# Patient Record
Sex: FEMALE | Race: OTHER | HISPANIC OR LATINO | URBAN - METROPOLITAN AREA
[De-identification: names, ages, dates, MRNs, and addresses within clinical notes are randomized per-mention and may not be internally consistent; named-entity substitution may affect disease eponyms.]

---

## 2017-07-01 ENCOUNTER — EMERGENCY (EMERGENCY)
Facility: HOSPITAL | Age: 37
LOS: 1 days | Discharge: HOME | End: 2017-07-01
Attending: STUDENT IN AN ORGANIZED HEALTH CARE EDUCATION/TRAINING PROGRAM
Payer: COMMERCIAL

## 2017-07-01 VITALS
HEART RATE: 75 BPM | OXYGEN SATURATION: 99 % | RESPIRATION RATE: 18 BRPM | DIASTOLIC BLOOD PRESSURE: 93 MMHG | TEMPERATURE: 98 F | SYSTOLIC BLOOD PRESSURE: 147 MMHG

## 2017-07-01 VITALS — WEIGHT: 149.91 LBS | HEIGHT: 61 IN

## 2017-07-01 DIAGNOSIS — J39.2 OTHER DISEASES OF PHARYNX: ICD-10-CM

## 2017-07-01 DIAGNOSIS — E78.00 PURE HYPERCHOLESTEROLEMIA, UNSPECIFIED: ICD-10-CM

## 2017-07-01 DIAGNOSIS — E03.9 HYPOTHYROIDISM, UNSPECIFIED: ICD-10-CM

## 2017-07-01 DIAGNOSIS — Z98.890 OTHER SPECIFIED POSTPROCEDURAL STATES: ICD-10-CM

## 2017-07-01 DIAGNOSIS — R51 HEADACHE: ICD-10-CM

## 2017-07-01 PROCEDURE — 99284 EMERGENCY DEPT VISIT MOD MDM: CPT | Mod: 25

## 2017-07-02 LAB
ALBUMIN SERPL ELPH-MCNC: 4.7 G/DL — SIGNIFICANT CHANGE UP (ref 3.3–5.2)
ALP SERPL-CCNC: 63 U/L — SIGNIFICANT CHANGE UP (ref 40–120)
ALT FLD-CCNC: 43 U/L — HIGH
ANION GAP SERPL CALC-SCNC: 13 MMOL/L — SIGNIFICANT CHANGE UP (ref 5–17)
AST SERPL-CCNC: 24 U/L — SIGNIFICANT CHANGE UP
BASOPHILS # BLD AUTO: 0 K/UL — SIGNIFICANT CHANGE UP (ref 0–0.2)
BASOPHILS NFR BLD AUTO: 0.1 % — SIGNIFICANT CHANGE UP (ref 0–2)
BILIRUB SERPL-MCNC: 0.2 MG/DL — LOW (ref 0.4–2)
BUN SERPL-MCNC: 13 MG/DL — SIGNIFICANT CHANGE UP (ref 8–20)
CALCIUM SERPL-MCNC: 9.1 MG/DL — SIGNIFICANT CHANGE UP (ref 8.6–10.2)
CHLORIDE SERPL-SCNC: 99 MMOL/L — SIGNIFICANT CHANGE UP (ref 98–107)
CO2 SERPL-SCNC: 26 MMOL/L — SIGNIFICANT CHANGE UP (ref 22–29)
CREAT SERPL-MCNC: 0.61 MG/DL — SIGNIFICANT CHANGE UP (ref 0.5–1.3)
EOSINOPHIL # BLD AUTO: 0.2 K/UL — SIGNIFICANT CHANGE UP (ref 0–0.5)
EOSINOPHIL NFR BLD AUTO: 2.5 % — SIGNIFICANT CHANGE UP (ref 0–6)
GLUCOSE SERPL-MCNC: 105 MG/DL — SIGNIFICANT CHANGE UP (ref 70–115)
HCT VFR BLD CALC: 40.7 % — SIGNIFICANT CHANGE UP (ref 37–47)
HGB BLD-MCNC: 13.8 G/DL — SIGNIFICANT CHANGE UP (ref 12–16)
LYMPHOCYTES # BLD AUTO: 2.3 K/UL — SIGNIFICANT CHANGE UP (ref 1–4.8)
LYMPHOCYTES # BLD AUTO: 34.3 % — SIGNIFICANT CHANGE UP (ref 20–55)
MCHC RBC-ENTMCNC: 28.5 PG — SIGNIFICANT CHANGE UP (ref 27–31)
MCHC RBC-ENTMCNC: 33.9 G/DL — SIGNIFICANT CHANGE UP (ref 32–36)
MCV RBC AUTO: 83.9 FL — SIGNIFICANT CHANGE UP (ref 81–99)
MONOCYTES # BLD AUTO: 0.6 K/UL — SIGNIFICANT CHANGE UP (ref 0–0.8)
MONOCYTES NFR BLD AUTO: 9.3 % — SIGNIFICANT CHANGE UP (ref 3–10)
NEUTROPHILS # BLD AUTO: 3.6 K/UL — SIGNIFICANT CHANGE UP (ref 1.8–8)
NEUTROPHILS NFR BLD AUTO: 53.5 % — SIGNIFICANT CHANGE UP (ref 37–73)
PLATELET # BLD AUTO: 356 K/UL — SIGNIFICANT CHANGE UP (ref 150–400)
POTASSIUM SERPL-MCNC: 3.8 MMOL/L — SIGNIFICANT CHANGE UP (ref 3.5–5.3)
POTASSIUM SERPL-SCNC: 3.8 MMOL/L — SIGNIFICANT CHANGE UP (ref 3.5–5.3)
PROT SERPL-MCNC: 7.7 G/DL — SIGNIFICANT CHANGE UP (ref 6.6–8.7)
RBC # BLD: 4.85 M/UL — SIGNIFICANT CHANGE UP (ref 4.4–5.2)
RBC # FLD: 12.2 % — SIGNIFICANT CHANGE UP (ref 11–15.6)
SODIUM SERPL-SCNC: 138 MMOL/L — SIGNIFICANT CHANGE UP (ref 135–145)
T4 AB SER-ACNC: 7 UG/DL — SIGNIFICANT CHANGE UP (ref 4.5–12)
T4/T3 UPTAKE INDEX SERPL: 1.01 INDEX — SIGNIFICANT CHANGE UP (ref 0.8–1.3)
TSH SERPL-MCNC: 0.5 UIU/ML — SIGNIFICANT CHANGE UP (ref 0.27–4.2)
WBC # BLD: 6.7 K/UL — SIGNIFICANT CHANGE UP (ref 4.8–10.8)
WBC # FLD AUTO: 6.7 K/UL — SIGNIFICANT CHANGE UP (ref 4.8–10.8)

## 2017-07-02 PROCEDURE — 70450 CT HEAD/BRAIN W/O DYE: CPT

## 2017-07-02 PROCEDURE — 84443 ASSAY THYROID STIM HORMONE: CPT

## 2017-07-02 PROCEDURE — 36415 COLL VENOUS BLD VENIPUNCTURE: CPT

## 2017-07-02 PROCEDURE — 99284 EMERGENCY DEPT VISIT MOD MDM: CPT | Mod: 25

## 2017-07-02 PROCEDURE — 84479 ASSAY OF THYROID (T3 OR T4): CPT

## 2017-07-02 PROCEDURE — 93005 ELECTROCARDIOGRAM TRACING: CPT

## 2017-07-02 PROCEDURE — 80053 COMPREHEN METABOLIC PANEL: CPT

## 2017-07-02 PROCEDURE — 70450 CT HEAD/BRAIN W/O DYE: CPT | Mod: 26

## 2017-07-02 PROCEDURE — 93010 ELECTROCARDIOGRAM REPORT: CPT

## 2017-07-02 PROCEDURE — 85027 COMPLETE CBC AUTOMATED: CPT

## 2017-07-02 PROCEDURE — 84436 ASSAY OF TOTAL THYROXINE: CPT

## 2017-07-02 RX ORDER — LIDOCAINE 4 G/100G
10 CREAM TOPICAL ONCE
Qty: 0 | Refills: 0 | Status: COMPLETED | OUTPATIENT
Start: 2017-07-02 | End: 2017-07-02

## 2017-07-02 RX ADMIN — LIDOCAINE 10 MILLILITER(S): 4 CREAM TOPICAL at 02:08

## 2017-07-02 NOTE — ED ADULT NURSE NOTE - OBJECTIVE STATEMENT
PT awake and alert x4, presents to ED c/o sore throat and headache x1 week. pt reports sudden onset of symptoms that worsened throughout the week. pt denies fever, chills or night sweats. pt reports 1 episode of vomiting on Sunday but has not vomited since. pt reports nausea. pt denies dizziness, lightheadedness. pt states headache worsens with bright lights. pt denies chest pain or SOB. pt lying comfortably I nbed, family at bedside will continue to monitor.

## 2017-07-02 NOTE — ED PROVIDER NOTE - OBJECTIVE STATEMENT
37 y/o female with a hx of HDL, and hypothyroidism presents to the ED c/o HA that onset 7 days ago. Pt states that the HA has been constant. She also notes "Throat pain" which onset 1 week ago. Nothing makes the pain better or worse. Denies numbness, tingling, fever, chills, vomiting, CP, change in vision/gait/speech or photophobia. No further complaints at this time. 37 y/o female with a hx of HDL, and hypothyroidism presents to the ED c/o HA that onset 7 days ago. Pt states that the HA has been constant. She also notes "Throat discomfort" which onset 1 week ago; feels like there is a tightness around her throat. Nothing makes the pain better or worse. Denies numbness, tingling, fever, chills, vomiting, CP, change in vision/gait/speech or photophobia. No further complaints at this time.

## 2018-03-12 ENCOUNTER — APPOINTMENT (OUTPATIENT)
Dept: DERMATOLOGY | Facility: CLINIC | Age: 38
End: 2018-03-12
Payer: COMMERCIAL

## 2018-03-12 PROCEDURE — 99201 OFFICE OUTPATIENT NEW 10 MINUTES: CPT

## 2018-03-25 ENCOUNTER — EMERGENCY (EMERGENCY)
Facility: HOSPITAL | Age: 38
LOS: 1 days | Discharge: HOME | End: 2018-03-25
Attending: EMERGENCY MEDICINE | Admitting: EMERGENCY MEDICINE
Payer: COMMERCIAL

## 2018-03-25 VITALS
OXYGEN SATURATION: 99 % | RESPIRATION RATE: 17 BRPM | DIASTOLIC BLOOD PRESSURE: 72 MMHG | HEART RATE: 68 BPM | SYSTOLIC BLOOD PRESSURE: 128 MMHG

## 2018-03-25 VITALS — HEIGHT: 62 IN | WEIGHT: 149.03 LBS

## 2018-03-25 LAB
ALBUMIN SERPL ELPH-MCNC: 4.7 G/DL — SIGNIFICANT CHANGE UP (ref 3.3–5.2)
ALP SERPL-CCNC: 76 U/L — SIGNIFICANT CHANGE UP (ref 40–120)
ALT FLD-CCNC: 40 U/L — HIGH
ANION GAP SERPL CALC-SCNC: 11 MMOL/L — SIGNIFICANT CHANGE UP (ref 5–17)
AST SERPL-CCNC: 26 U/L — SIGNIFICANT CHANGE UP
BASOPHILS # BLD AUTO: 0 K/UL — SIGNIFICANT CHANGE UP (ref 0–0.2)
BASOPHILS NFR BLD AUTO: 0.3 % — SIGNIFICANT CHANGE UP (ref 0–2)
BILIRUB SERPL-MCNC: <0.2 MG/DL — LOW (ref 0.4–2)
BUN SERPL-MCNC: 8 MG/DL — SIGNIFICANT CHANGE UP (ref 8–20)
CALCIUM SERPL-MCNC: 9.8 MG/DL — SIGNIFICANT CHANGE UP (ref 8.6–10.2)
CHLORIDE SERPL-SCNC: 100 MMOL/L — SIGNIFICANT CHANGE UP (ref 98–107)
CO2 SERPL-SCNC: 29 MMOL/L — SIGNIFICANT CHANGE UP (ref 22–29)
CREAT SERPL-MCNC: 0.65 MG/DL — SIGNIFICANT CHANGE UP (ref 0.5–1.3)
EOSINOPHIL # BLD AUTO: 0.2 K/UL — SIGNIFICANT CHANGE UP (ref 0–0.5)
EOSINOPHIL NFR BLD AUTO: 3.2 % — SIGNIFICANT CHANGE UP (ref 0–6)
GLUCOSE SERPL-MCNC: 95 MG/DL — SIGNIFICANT CHANGE UP (ref 70–115)
HCG SERPL-ACNC: <5 MIU/ML — SIGNIFICANT CHANGE UP
HCT VFR BLD CALC: 45.2 % — SIGNIFICANT CHANGE UP (ref 37–47)
HGB BLD-MCNC: 15.3 G/DL — SIGNIFICANT CHANGE UP (ref 12–16)
LYMPHOCYTES # BLD AUTO: 2.4 K/UL — SIGNIFICANT CHANGE UP (ref 1–4.8)
LYMPHOCYTES # BLD AUTO: 37 % — SIGNIFICANT CHANGE UP (ref 20–55)
MCHC RBC-ENTMCNC: 29.4 PG — SIGNIFICANT CHANGE UP (ref 27–31)
MCHC RBC-ENTMCNC: 33.8 G/DL — SIGNIFICANT CHANGE UP (ref 32–36)
MCV RBC AUTO: 86.9 FL — SIGNIFICANT CHANGE UP (ref 81–99)
MONOCYTES # BLD AUTO: 0.6 K/UL — SIGNIFICANT CHANGE UP (ref 0–0.8)
MONOCYTES NFR BLD AUTO: 8.4 % — SIGNIFICANT CHANGE UP (ref 3–10)
NEUTROPHILS # BLD AUTO: 3.3 K/UL — SIGNIFICANT CHANGE UP (ref 1.8–8)
NEUTROPHILS NFR BLD AUTO: 50.9 % — SIGNIFICANT CHANGE UP (ref 37–73)
PLATELET # BLD AUTO: 363 K/UL — SIGNIFICANT CHANGE UP (ref 150–400)
POTASSIUM SERPL-MCNC: 4.3 MMOL/L — SIGNIFICANT CHANGE UP (ref 3.5–5.3)
POTASSIUM SERPL-SCNC: 4.3 MMOL/L — SIGNIFICANT CHANGE UP (ref 3.5–5.3)
PROT SERPL-MCNC: 7.9 G/DL — SIGNIFICANT CHANGE UP (ref 6.6–8.7)
RBC # BLD: 5.2 M/UL — SIGNIFICANT CHANGE UP (ref 4.4–5.2)
RBC # FLD: 12.3 % — SIGNIFICANT CHANGE UP (ref 11–15.6)
SODIUM SERPL-SCNC: 140 MMOL/L — SIGNIFICANT CHANGE UP (ref 135–145)
WBC # BLD: 6.6 K/UL — SIGNIFICANT CHANGE UP (ref 4.8–10.8)
WBC # FLD AUTO: 6.6 K/UL — SIGNIFICANT CHANGE UP (ref 4.8–10.8)

## 2018-03-25 PROCEDURE — 74019 RADEX ABDOMEN 2 VIEWS: CPT

## 2018-03-25 PROCEDURE — 85027 COMPLETE CBC AUTOMATED: CPT

## 2018-03-25 PROCEDURE — T1013: CPT

## 2018-03-25 PROCEDURE — 36415 COLL VENOUS BLD VENIPUNCTURE: CPT

## 2018-03-25 PROCEDURE — 96374 THER/PROPH/DIAG INJ IV PUSH: CPT

## 2018-03-25 PROCEDURE — 80053 COMPREHEN METABOLIC PANEL: CPT

## 2018-03-25 PROCEDURE — 99284 EMERGENCY DEPT VISIT MOD MDM: CPT

## 2018-03-25 PROCEDURE — 74019 RADEX ABDOMEN 2 VIEWS: CPT | Mod: 26

## 2018-03-25 PROCEDURE — 84702 CHORIONIC GONADOTROPIN TEST: CPT

## 2018-03-25 PROCEDURE — 99284 EMERGENCY DEPT VISIT MOD MDM: CPT | Mod: 25

## 2018-03-25 RX ORDER — DOCUSATE SODIUM 100 MG
1 CAPSULE ORAL
Qty: 60 | Refills: 0 | OUTPATIENT
Start: 2018-03-25 | End: 2018-04-23

## 2018-03-25 RX ORDER — PANTOPRAZOLE SODIUM 20 MG/1
40 TABLET, DELAYED RELEASE ORAL ONCE
Qty: 0 | Refills: 0 | Status: COMPLETED | OUTPATIENT
Start: 2018-03-25 | End: 2018-03-25

## 2018-03-25 RX ORDER — SODIUM CHLORIDE 9 MG/ML
1000 INJECTION INTRAMUSCULAR; INTRAVENOUS; SUBCUTANEOUS ONCE
Qty: 0 | Refills: 0 | Status: COMPLETED | OUTPATIENT
Start: 2018-03-25 | End: 2018-03-25

## 2018-03-25 RX ORDER — POLYETHYLENE GLYCOL 3350 17 G/17G
17 POWDER, FOR SOLUTION ORAL
Qty: 119 | Refills: 0 | OUTPATIENT
Start: 2018-03-25 | End: 2018-03-31

## 2018-03-25 RX ORDER — SODIUM CHLORIDE 9 MG/ML
3 INJECTION INTRAMUSCULAR; INTRAVENOUS; SUBCUTANEOUS ONCE
Qty: 0 | Refills: 0 | Status: COMPLETED | OUTPATIENT
Start: 2018-03-25 | End: 2018-03-25

## 2018-03-25 RX ADMIN — PANTOPRAZOLE SODIUM 40 MILLIGRAM(S): 20 TABLET, DELAYED RELEASE ORAL at 17:18

## 2018-03-25 RX ADMIN — SODIUM CHLORIDE 1000 MILLILITER(S): 9 INJECTION INTRAMUSCULAR; INTRAVENOUS; SUBCUTANEOUS at 17:18

## 2018-03-25 RX ADMIN — SODIUM CHLORIDE 3 MILLILITER(S): 9 INJECTION INTRAMUSCULAR; INTRAVENOUS; SUBCUTANEOUS at 17:18

## 2018-03-25 NOTE — ED STATDOCS - MEDICAL DECISION MAKING DETAILS
Pt presents with constipation and hx of 1 episode of rectal bleed. will hydrate, check labs and X-rays.

## 2018-03-25 NOTE — ED ADULT TRIAGE NOTE - CHIEF COMPLAINT QUOTE
"Since Tuesday I can't have a bowel movement and I am bleeding from my rectal area. I took prune juice and used a suppository but no relief. "  Pt states she can't use the a bathroom pt states she has lower abdominal pain and now feels bloated and like the pain is in her upper abdomen.

## 2018-03-25 NOTE — ED STATDOCS - OBJECTIVE STATEMENT
38 y/o F pt with a hx of hypothyroidism  presents to the ED with c/o constipation and abd pain x 5 days. Pt states that she noted some blood while trying to make a BM 5 days ago. Did not notice any blood ever since. Pt describes the pain as sharp, constant and 5/10. she takes levothyroxine. denies NVD, fevers, chills. No further complaints at this time.

## 2018-03-25 NOTE — ED ADULT NURSE NOTE - OBJECTIVE STATEMENT
36 yo female presents to ed for abdominal pain and difficulty having bm x 4 days. reports that she noticed blood when she wiped "feel like something is there"

## 2018-04-02 ENCOUNTER — APPOINTMENT (OUTPATIENT)
Dept: DERMATOLOGY | Facility: CLINIC | Age: 38
End: 2018-04-02
Payer: COMMERCIAL

## 2018-04-02 PROCEDURE — 99213 OFFICE O/P EST LOW 20 MIN: CPT

## 2018-04-18 ENCOUNTER — APPOINTMENT (OUTPATIENT)
Dept: DERMATOLOGY | Facility: CLINIC | Age: 38
End: 2018-04-18
Payer: COMMERCIAL

## 2018-04-18 PROCEDURE — 99213 OFFICE O/P EST LOW 20 MIN: CPT

## 2018-08-22 ENCOUNTER — APPOINTMENT (OUTPATIENT)
Dept: DERMATOLOGY | Facility: CLINIC | Age: 38
End: 2018-08-22

## 2018-10-06 ENCOUNTER — EMERGENCY (EMERGENCY)
Facility: HOSPITAL | Age: 38
LOS: 1 days | Discharge: HOME | End: 2018-10-06
Attending: EMERGENCY MEDICINE
Payer: COMMERCIAL

## 2018-10-06 VITALS
DIASTOLIC BLOOD PRESSURE: 78 MMHG | SYSTOLIC BLOOD PRESSURE: 134 MMHG | TEMPERATURE: 98 F | HEART RATE: 72 BPM | RESPIRATION RATE: 16 BRPM | OXYGEN SATURATION: 99 %

## 2018-10-06 VITALS — WEIGHT: 149.91 LBS | HEIGHT: 62 IN

## 2018-10-06 PROCEDURE — 99283 EMERGENCY DEPT VISIT LOW MDM: CPT

## 2018-10-06 RX ORDER — ACETAMINOPHEN 500 MG
975 TABLET ORAL ONCE
Qty: 0 | Refills: 0 | Status: COMPLETED | OUTPATIENT
Start: 2018-10-06 | End: 2018-10-06

## 2018-10-06 RX ADMIN — Medication 975 MILLIGRAM(S): at 19:50

## 2018-10-06 NOTE — ED STATDOCS - OBJECTIVE STATEMENT
39 y/o F pt with hx of thyroid presents to ED c/o L ankle/foot pain for 3 days. Pt reports that 3 days ago, she was reaching for something, and felt a pull on her L ankle. Pt reports that when she raises her foot up, she feels pain. Pt's pain aggravates with movement. NKDA. No further complaints at this time. 39 y/o F pt presents to ED c/o L ankle/foot pain for 3 days. Pt reports that 3 days ago, she was reaching for something, and felt a pull on her L ankle. Pt reports that when she raises her foot up, she feels pain. Pt's pain aggravates with movement. NKDA. No further complaints at this time.

## 2018-10-06 NOTE — ED STATDOCS - MEDICAL DECISION MAKING DETAILS
Pt with likely ankle sprain/tendonitis, no bony tenderness to palpation. Will give Tylenol and DC with outpatient follow-up. Pt with likely ankle sprain/tendonitis, no bony tenderness to palpation. ambulatorion without difficulty, no concern for fx Will give Tylenol and DC with outpatient follow-up.

## 2018-10-06 NOTE — ED ADULT NURSE NOTE - OBJECTIVE STATEMENT
Patient complaining of pain to left foot x2 days, stated when walks feels like her feet are numb. Stated when puts leg up, feels like there is a "ripping" sensation to bottom of foot. Stated injury occurred when she was attempting to reach something up high.

## 2018-10-06 NOTE — ED STATDOCS - CARE PLAN
Principal Discharge DX:	Ankle sprain  Assessment and plan of treatment:	1. Return to ED for worsening, progressive or any other concerning symptoms   2. Follow up with your primary care doctor in 2-3days   3. Take motrin 600mg every 6 hours as needed for pain and Take Tylenol up to 650 mg every 6 hours as needed for pain.   4. Follow up with a podiatrist or Follow up with orthopedic clinic [phone].   Open Thursday 1-9pm  Saturday 8-4pm

## 2018-10-06 NOTE — ED STATDOCS - NS_ ATTENDINGSCRIBEDETAILS _ED_A_ED_FT
I, Wendy Leon, performed the initial face to face bedside interview with this patient regarding history of present illness, review of symptoms and relevant past medical, social and family history.  I completed an independent physical examination.  The history, relevant review of systems, past medical and surgical history, medical decision making, and physical examination was documented by the scribe in my presence and I attest to the accuracy of the documentation.

## 2018-10-06 NOTE — ED STATDOCS - PLAN OF CARE
1. Return to ED for worsening, progressive or any other concerning symptoms   2. Follow up with your primary care doctor in 2-3days   3. Take motrin 600mg every 6 hours as needed for pain and Take Tylenol up to 650 mg every 6 hours as needed for pain.   4. Follow up with a podiatrist or Follow up with orthopedic clinic [phone].   Open Thursday 1-9pm  Saturday 8-4pm

## 2018-10-06 NOTE — ED STATDOCS - NS ED ROS FT
ROS: + ankle pain, foot pain - no CP/SOB. no cough. no fever. no n/v/d/c. no abd pain. no rash. no bleeding. no urinary complaints. no weakness. no vision changes. no HA. no neck/back pain. no extremity swelling/deformity. No change in mental status.

## 2018-10-06 NOTE — ED STATDOCS - PHYSICAL EXAMINATION
Gen: NAD, AOx3  Head: NCAT  HEENT: oral mucosa moist, normal conjunctiva, neck supple  Lung: no respiratory distress  CV: Normal perfusion  MSK: No edema, no visible deformities. Positive tenderness lateral; aspect of L ankle, soft tissues. Worse with dorsi flexion.   Neuro: No focal neurologic deficits  Skin: No rash   Psych: normal affect Gen: NAD, AOx3  Head: NCAT  HEENT: oral mucosa moist, normal conjunctiva, neck supple  Lung: no respiratory distress  CV: Normal perfusion  MSK: No edema, no visible deformities. +tenderness lateral; aspect of L ankle, soft tissues. Worse with dorsi flexion. no ttp medial or lateral malleoli and no ttp base 5th metatarsal   Neuro: No focal neurologic deficits  Skin: No rash   Psych: normal affect

## 2018-12-11 ENCOUNTER — APPOINTMENT (OUTPATIENT)
Dept: DERMATOLOGY | Facility: CLINIC | Age: 38
End: 2018-12-11

## 2019-01-03 ENCOUNTER — APPOINTMENT (OUTPATIENT)
Dept: DERMATOLOGY | Facility: CLINIC | Age: 39
End: 2019-01-03
Payer: COMMERCIAL

## 2019-01-03 PROCEDURE — 99212 OFFICE O/P EST SF 10 MIN: CPT

## 2019-12-19 ENCOUNTER — EMERGENCY (EMERGENCY)
Facility: HOSPITAL | Age: 39
LOS: 1 days | Discharge: HOME | End: 2019-12-19
Attending: EMERGENCY MEDICINE
Payer: COMMERCIAL

## 2019-12-19 VITALS
OXYGEN SATURATION: 98 % | RESPIRATION RATE: 18 BRPM | TEMPERATURE: 98 F | SYSTOLIC BLOOD PRESSURE: 122 MMHG | HEART RATE: 78 BPM | DIASTOLIC BLOOD PRESSURE: 79 MMHG

## 2019-12-19 VITALS
HEIGHT: 63 IN | WEIGHT: 153 LBS | TEMPERATURE: 98 F | HEART RATE: 66 BPM | SYSTOLIC BLOOD PRESSURE: 137 MMHG | DIASTOLIC BLOOD PRESSURE: 96 MMHG | OXYGEN SATURATION: 100 % | RESPIRATION RATE: 18 BRPM

## 2019-12-19 LAB
ALBUMIN SERPL ELPH-MCNC: 5.2 G/DL — SIGNIFICANT CHANGE UP (ref 3.3–5.2)
ALP SERPL-CCNC: 70 U/L — SIGNIFICANT CHANGE UP (ref 40–120)
ALT FLD-CCNC: 22 U/L — SIGNIFICANT CHANGE UP
ANION GAP SERPL CALC-SCNC: 13 MMOL/L — SIGNIFICANT CHANGE UP (ref 5–17)
APPEARANCE UR: CLEAR — SIGNIFICANT CHANGE UP
APTT BLD: 31.7 SEC — SIGNIFICANT CHANGE UP (ref 27.5–36.3)
AST SERPL-CCNC: 25 U/L — SIGNIFICANT CHANGE UP
BACTERIA # UR AUTO: ABNORMAL
BASOPHILS # BLD AUTO: 0.06 K/UL — SIGNIFICANT CHANGE UP (ref 0–0.2)
BASOPHILS NFR BLD AUTO: 0.6 % — SIGNIFICANT CHANGE UP (ref 0–2)
BILIRUB SERPL-MCNC: 0.3 MG/DL — LOW (ref 0.4–2)
BILIRUB UR-MCNC: NEGATIVE — SIGNIFICANT CHANGE UP
BLD GP AB SCN SERPL QL: SIGNIFICANT CHANGE UP
BUN SERPL-MCNC: 8 MG/DL — SIGNIFICANT CHANGE UP (ref 8–20)
CALCIUM SERPL-MCNC: 9.4 MG/DL — SIGNIFICANT CHANGE UP (ref 8.6–10.2)
CHLORIDE SERPL-SCNC: 98 MMOL/L — SIGNIFICANT CHANGE UP (ref 98–107)
CO2 SERPL-SCNC: 24 MMOL/L — SIGNIFICANT CHANGE UP (ref 22–29)
COLOR SPEC: YELLOW — SIGNIFICANT CHANGE UP
CREAT SERPL-MCNC: 0.64 MG/DL — SIGNIFICANT CHANGE UP (ref 0.5–1.3)
DIFF PNL FLD: ABNORMAL
EOSINOPHIL # BLD AUTO: 0.17 K/UL — SIGNIFICANT CHANGE UP (ref 0–0.5)
EOSINOPHIL NFR BLD AUTO: 1.7 % — SIGNIFICANT CHANGE UP (ref 0–6)
EPI CELLS # UR: SIGNIFICANT CHANGE UP
GLUCOSE SERPL-MCNC: 94 MG/DL — SIGNIFICANT CHANGE UP (ref 70–115)
GLUCOSE UR QL: NEGATIVE MG/DL — SIGNIFICANT CHANGE UP
HCG SERPL-ACNC: HIGH MIU/ML
HCG UR QL: POSITIVE
HCT VFR BLD CALC: 46 % — HIGH (ref 34.5–45)
HGB BLD-MCNC: 15.4 G/DL — SIGNIFICANT CHANGE UP (ref 11.5–15.5)
IMM GRANULOCYTES NFR BLD AUTO: 0.3 % — SIGNIFICANT CHANGE UP (ref 0–1.5)
INR BLD: 0.93 RATIO — SIGNIFICANT CHANGE UP (ref 0.88–1.16)
KETONES UR-MCNC: NEGATIVE — SIGNIFICANT CHANGE UP
LEUKOCYTE ESTERASE UR-ACNC: ABNORMAL
LYMPHOCYTES # BLD AUTO: 3.26 K/UL — SIGNIFICANT CHANGE UP (ref 1–3.3)
LYMPHOCYTES # BLD AUTO: 32.3 % — SIGNIFICANT CHANGE UP (ref 13–44)
MCHC RBC-ENTMCNC: 29.5 PG — SIGNIFICANT CHANGE UP (ref 27–34)
MCHC RBC-ENTMCNC: 33.5 GM/DL — SIGNIFICANT CHANGE UP (ref 32–36)
MCV RBC AUTO: 88.1 FL — SIGNIFICANT CHANGE UP (ref 80–100)
MONOCYTES # BLD AUTO: 0.69 K/UL — SIGNIFICANT CHANGE UP (ref 0–0.9)
MONOCYTES NFR BLD AUTO: 6.8 % — SIGNIFICANT CHANGE UP (ref 2–14)
NEUTROPHILS # BLD AUTO: 5.88 K/UL — SIGNIFICANT CHANGE UP (ref 1.8–7.4)
NEUTROPHILS NFR BLD AUTO: 58.3 % — SIGNIFICANT CHANGE UP (ref 43–77)
NITRITE UR-MCNC: NEGATIVE — SIGNIFICANT CHANGE UP
PH UR: 7 — SIGNIFICANT CHANGE UP (ref 5–8)
PLATELET # BLD AUTO: 412 K/UL — HIGH (ref 150–400)
POTASSIUM SERPL-MCNC: 4.1 MMOL/L — SIGNIFICANT CHANGE UP (ref 3.5–5.3)
POTASSIUM SERPL-SCNC: 4.1 MMOL/L — SIGNIFICANT CHANGE UP (ref 3.5–5.3)
PROT SERPL-MCNC: 8.5 G/DL — SIGNIFICANT CHANGE UP (ref 6.6–8.7)
PROT UR-MCNC: NEGATIVE MG/DL — SIGNIFICANT CHANGE UP
PROTHROM AB SERPL-ACNC: 10.7 SEC — SIGNIFICANT CHANGE UP (ref 10–12.9)
RBC # BLD: 5.22 M/UL — HIGH (ref 3.8–5.2)
RBC # FLD: 12.6 % — SIGNIFICANT CHANGE UP (ref 10.3–14.5)
RBC CASTS # UR COMP ASSIST: SIGNIFICANT CHANGE UP /HPF (ref 0–4)
SODIUM SERPL-SCNC: 135 MMOL/L — SIGNIFICANT CHANGE UP (ref 135–145)
SP GR SPEC: 1 — LOW (ref 1.01–1.02)
UROBILINOGEN FLD QL: NEGATIVE MG/DL — SIGNIFICANT CHANGE UP
WBC # BLD: 10.09 K/UL — SIGNIFICANT CHANGE UP (ref 3.8–10.5)
WBC # FLD AUTO: 10.09 K/UL — SIGNIFICANT CHANGE UP (ref 3.8–10.5)
WBC UR QL: SIGNIFICANT CHANGE UP

## 2019-12-19 PROCEDURE — 85610 PROTHROMBIN TIME: CPT

## 2019-12-19 PROCEDURE — 76801 OB US < 14 WKS SINGLE FETUS: CPT | Mod: 26

## 2019-12-19 PROCEDURE — 81025 URINE PREGNANCY TEST: CPT

## 2019-12-19 PROCEDURE — 81001 URINALYSIS AUTO W/SCOPE: CPT

## 2019-12-19 PROCEDURE — 99284 EMERGENCY DEPT VISIT MOD MDM: CPT

## 2019-12-19 PROCEDURE — 84702 CHORIONIC GONADOTROPIN TEST: CPT

## 2019-12-19 PROCEDURE — 86850 RBC ANTIBODY SCREEN: CPT

## 2019-12-19 PROCEDURE — 36415 COLL VENOUS BLD VENIPUNCTURE: CPT

## 2019-12-19 PROCEDURE — 86901 BLOOD TYPING SEROLOGIC RH(D): CPT

## 2019-12-19 PROCEDURE — 87186 SC STD MICRODIL/AGAR DIL: CPT

## 2019-12-19 PROCEDURE — 85027 COMPLETE CBC AUTOMATED: CPT

## 2019-12-19 PROCEDURE — 87086 URINE CULTURE/COLONY COUNT: CPT

## 2019-12-19 PROCEDURE — 80053 COMPREHEN METABOLIC PANEL: CPT

## 2019-12-19 PROCEDURE — 86900 BLOOD TYPING SEROLOGIC ABO: CPT

## 2019-12-19 PROCEDURE — 76801 OB US < 14 WKS SINGLE FETUS: CPT

## 2019-12-19 PROCEDURE — 85730 THROMBOPLASTIN TIME PARTIAL: CPT

## 2019-12-19 NOTE — ED STATDOCS - OBJECTIVE STATEMENT
38 y/o F with PMHx of 2 C-sections, Hypercholesteremia, hypothyroidism , presents to the ED  c/o intermittent LLQ and back pain that began 1 week ago. Pt states pain radiating from the front  to the back. Sx exacerbated with ambulating and PO intake. Pt notes associated nausea and dysuria for the past week. Denies vomiting, diarrhea, fevers. NKDA.

## 2019-12-19 NOTE — ED STATDOCS - NSFOLLOWUPINSTRUCTIONS_ED_ALL_ED_FT
please follow with obgyn   monitor for worsening of symptoms   tylenol and rest   for worsening symptoms or vaginal bleeding please return to the ER for further evaluation and care or contact obgyn

## 2019-12-19 NOTE — ED STATDOCS - ATTENDING CONTRIBUTION TO CARE
I, Joel Roy, performed the initial face to face bedside interview with this patient regarding history of present illness, review of symptoms and relevant past medical, social and family history.  I completed an independent physical examination.  I was the initial provider who evaluated this patient. I have signed out the follow up of any pending tests (i.e. labs, radiological studies) to the ACP.  I have communicated the patient’s plan of care and disposition with the ACP.

## 2019-12-19 NOTE — ED STATDOCS - PATIENT PORTAL LINK FT
You can access the FollowMyHealth Patient Portal offered by [facility] by registering at the following website: [email or web address]. By joining [employer]’s FollowMyHealth portal, you will also be able to view your health information using other applications (apps) compatible with our system.

## 2019-12-19 NOTE — ED STATDOCS - PHYSICAL EXAMINATION
VITAL SIGNS: I have reviewed nursing notes and confirm.  CONSTITUTIONAL: Well-developed; well-nourished; in no acute distress.  SKIN: Skin exam is warm and dry, no acute rash.  HEAD: Normocephalic; atraumatic.  EYES: PERRL, EOM intact; conjunctiva and sclera clear.  ENT: No nasal discharge; airway clear. Throat clear.  NECK: Supple; non tender.    CARD: S1, S2 normal; no murmurs, gallops, or rubs. Regular rate and rhythm.  RESP: No wheezes,  no rales or rhonchi.   ABD:  soft; non-distended; non-tender;   EXT: Normal ROM. No clubbing, cyanosis or edema. (+)Left Lower pelvic pain  NEURO: Alert, oriented. Grossly unremarkable.  PSYCH: Cooperative, appropriate.

## 2019-12-19 NOTE — ED STATDOCS - PROGRESS NOTE DETAILS
FERN Hameed NOTE: Pt evaluated at bedside. LMP unknown, is irregular. PE: non toxic, MMM, RRR, lungs CTA, abd soft mild LLQ no rebound or guarding. No CVAT. Pt evaluated prior by intake physician. Otherwise HPI/PE/ROS as noted above. Will follow up plan per intake physician. FERN Hameed NOTE: + Urine pregnancy, informed pt of pregnancy and need for labs/US, pt agreeable. Pt is a A1, unknown LMP, unknown weeks, unknown name of GYN. Denies vaginal bleeding. Hx 2 C/S and 1 miscarriage. FERN Hameed NOTE: Pt signed out to FERN Hooper pending US and labs. FERN SANCHEZ: pt with singular live IUP advised on fu

## 2019-12-19 NOTE — ED STATDOCS - NS ED ROS FT
Review of Systems  CONSTITUTIONAL - no  fever, no diaphoresis, no weight change  SKIN - no rash  HEMATOLOGIC - no bleeding, no bruising  EYES - no eye pain, no blurred vision  ENT - no change in hearing, no pain  RESPIRATORY - no shortness of breath, no cough  CARDIAC - no chest pain, no palpitations  GI - , no vomiting, no diarrhea, no constipation, no bleeding (+) LLQ pain. (+) nausea  GENITO-URINARY - no discharge,  no hematuria, (+)dysuria  ENDO - no polydypsia, no polyurea, no heat/no cold intolerance  MUSCULOSKELETAL -  no swelling, no redness (+) back pain  NEUROLOGIC - no weakness, no headache, no anesthesia, no paresthesias  PSYCH - no anxiety, non suicidal, non homicidal, no hallucination, no depression

## 2019-12-22 NOTE — ED POST DISCHARGE NOTE - RESULT SUMMARY
bacteria in urine, pregnant, will need abx.  Called listed number, no answer, no answering machine, certified letter sent

## 2019-12-27 RX ORDER — CEPHALEXIN 500 MG
1 CAPSULE ORAL
Qty: 10 | Refills: 0
Start: 2019-12-27 | End: 2019-12-31

## 2020-01-30 ENCOUNTER — EMERGENCY (EMERGENCY)
Facility: HOSPITAL | Age: 40
LOS: 1 days | Discharge: HOME | End: 2020-01-30
Attending: EMERGENCY MEDICINE
Payer: COMMERCIAL

## 2020-01-30 VITALS
RESPIRATION RATE: 20 BRPM | SYSTOLIC BLOOD PRESSURE: 126 MMHG | DIASTOLIC BLOOD PRESSURE: 78 MMHG | TEMPERATURE: 98 F | OXYGEN SATURATION: 97 % | HEART RATE: 71 BPM

## 2020-01-30 VITALS
OXYGEN SATURATION: 98 % | WEIGHT: 151.02 LBS | TEMPERATURE: 98 F | HEART RATE: 74 BPM | RESPIRATION RATE: 20 BRPM | DIASTOLIC BLOOD PRESSURE: 98 MMHG | SYSTOLIC BLOOD PRESSURE: 151 MMHG

## 2020-01-30 LAB
ALBUMIN SERPL ELPH-MCNC: 4.6 G/DL — SIGNIFICANT CHANGE UP (ref 3.3–5.2)
ALP SERPL-CCNC: 48 U/L — SIGNIFICANT CHANGE UP (ref 40–120)
ALT FLD-CCNC: 13 U/L — SIGNIFICANT CHANGE UP
ANION GAP SERPL CALC-SCNC: 10 MMOL/L — SIGNIFICANT CHANGE UP (ref 5–17)
APPEARANCE UR: ABNORMAL
AST SERPL-CCNC: 18 U/L — SIGNIFICANT CHANGE UP
BACTERIA # UR AUTO: NEGATIVE — SIGNIFICANT CHANGE UP
BASOPHILS # BLD AUTO: 0.04 K/UL — SIGNIFICANT CHANGE UP (ref 0–0.2)
BASOPHILS NFR BLD AUTO: 0.5 % — SIGNIFICANT CHANGE UP (ref 0–2)
BILIRUB SERPL-MCNC: 0.4 MG/DL — SIGNIFICANT CHANGE UP (ref 0.4–2)
BILIRUB UR-MCNC: NEGATIVE — SIGNIFICANT CHANGE UP
BLD GP AB SCN SERPL QL: SIGNIFICANT CHANGE UP
BUN SERPL-MCNC: 6 MG/DL — LOW (ref 8–20)
CALCIUM SERPL-MCNC: 9.3 MG/DL — SIGNIFICANT CHANGE UP (ref 8.6–10.2)
CHLORIDE SERPL-SCNC: 99 MMOL/L — SIGNIFICANT CHANGE UP (ref 98–107)
CO2 SERPL-SCNC: 27 MMOL/L — SIGNIFICANT CHANGE UP (ref 22–29)
COLOR SPEC: ABNORMAL
COMMENT - URINE: SIGNIFICANT CHANGE UP
CREAT SERPL-MCNC: 0.53 MG/DL — SIGNIFICANT CHANGE UP (ref 0.5–1.3)
DIFF PNL FLD: ABNORMAL
EOSINOPHIL # BLD AUTO: 0.05 K/UL — SIGNIFICANT CHANGE UP (ref 0–0.5)
EOSINOPHIL NFR BLD AUTO: 0.7 % — SIGNIFICANT CHANGE UP (ref 0–6)
EPI CELLS # UR: SIGNIFICANT CHANGE UP
GLUCOSE SERPL-MCNC: 94 MG/DL — SIGNIFICANT CHANGE UP (ref 70–99)
GLUCOSE UR QL: NEGATIVE MG/DL — SIGNIFICANT CHANGE UP
HCG SERPL-ACNC: HIGH MIU/ML
HCT VFR BLD CALC: 43.5 % — SIGNIFICANT CHANGE UP (ref 34.5–45)
HGB BLD-MCNC: 14.6 G/DL — SIGNIFICANT CHANGE UP (ref 11.5–15.5)
IMM GRANULOCYTES NFR BLD AUTO: 0.4 % — SIGNIFICANT CHANGE UP (ref 0–1.5)
KETONES UR-MCNC: NEGATIVE — SIGNIFICANT CHANGE UP
LEUKOCYTE ESTERASE UR-ACNC: ABNORMAL
LYMPHOCYTES # BLD AUTO: 1.4 K/UL — SIGNIFICANT CHANGE UP (ref 1–3.3)
LYMPHOCYTES # BLD AUTO: 19 % — SIGNIFICANT CHANGE UP (ref 13–44)
MCHC RBC-ENTMCNC: 30.2 PG — SIGNIFICANT CHANGE UP (ref 27–34)
MCHC RBC-ENTMCNC: 33.6 GM/DL — SIGNIFICANT CHANGE UP (ref 32–36)
MCV RBC AUTO: 89.9 FL — SIGNIFICANT CHANGE UP (ref 80–100)
MONOCYTES # BLD AUTO: 0.45 K/UL — SIGNIFICANT CHANGE UP (ref 0–0.9)
MONOCYTES NFR BLD AUTO: 6.1 % — SIGNIFICANT CHANGE UP (ref 2–14)
NEUTROPHILS # BLD AUTO: 5.39 K/UL — SIGNIFICANT CHANGE UP (ref 1.8–7.4)
NEUTROPHILS NFR BLD AUTO: 73.3 % — SIGNIFICANT CHANGE UP (ref 43–77)
NITRITE UR-MCNC: NEGATIVE — SIGNIFICANT CHANGE UP
PH UR: 8 — SIGNIFICANT CHANGE UP (ref 5–8)
PLATELET # BLD AUTO: 375 K/UL — SIGNIFICANT CHANGE UP (ref 150–400)
POTASSIUM SERPL-MCNC: 3.9 MMOL/L — SIGNIFICANT CHANGE UP (ref 3.5–5.3)
POTASSIUM SERPL-SCNC: 3.9 MMOL/L — SIGNIFICANT CHANGE UP (ref 3.5–5.3)
PROT SERPL-MCNC: 7.7 G/DL — SIGNIFICANT CHANGE UP (ref 6.6–8.7)
PROT UR-MCNC: 30 MG/DL
RBC # BLD: 4.84 M/UL — SIGNIFICANT CHANGE UP (ref 3.8–5.2)
RBC # FLD: 12.7 % — SIGNIFICANT CHANGE UP (ref 10.3–14.5)
RBC CASTS # UR COMP ASSIST: SIGNIFICANT CHANGE UP /HPF (ref 0–4)
SODIUM SERPL-SCNC: 136 MMOL/L — SIGNIFICANT CHANGE UP (ref 135–145)
SP GR SPEC: 1.01 — SIGNIFICANT CHANGE UP (ref 1.01–1.02)
UROBILINOGEN FLD QL: NEGATIVE MG/DL — SIGNIFICANT CHANGE UP
WBC # BLD: 7.36 K/UL — SIGNIFICANT CHANGE UP (ref 3.8–10.5)
WBC # FLD AUTO: 7.36 K/UL — SIGNIFICANT CHANGE UP (ref 3.8–10.5)
WBC UR QL: SIGNIFICANT CHANGE UP

## 2020-01-30 PROCEDURE — 80053 COMPREHEN METABOLIC PANEL: CPT

## 2020-01-30 PROCEDURE — 76815 OB US LIMITED FETUS(S): CPT | Mod: 26

## 2020-01-30 PROCEDURE — 86901 BLOOD TYPING SEROLOGIC RH(D): CPT

## 2020-01-30 PROCEDURE — 87086 URINE CULTURE/COLONY COUNT: CPT

## 2020-01-30 PROCEDURE — 76801 OB US < 14 WKS SINGLE FETUS: CPT | Mod: 26

## 2020-01-30 PROCEDURE — 76817 TRANSVAGINAL US OBSTETRIC: CPT | Mod: 26

## 2020-01-30 PROCEDURE — 76801 OB US < 14 WKS SINGLE FETUS: CPT

## 2020-01-30 PROCEDURE — 99284 EMERGENCY DEPT VISIT MOD MDM: CPT

## 2020-01-30 PROCEDURE — 36415 COLL VENOUS BLD VENIPUNCTURE: CPT

## 2020-01-30 PROCEDURE — 86850 RBC ANTIBODY SCREEN: CPT

## 2020-01-30 PROCEDURE — 85027 COMPLETE CBC AUTOMATED: CPT

## 2020-01-30 PROCEDURE — 87186 SC STD MICRODIL/AGAR DIL: CPT

## 2020-01-30 PROCEDURE — 81001 URINALYSIS AUTO W/SCOPE: CPT

## 2020-01-30 PROCEDURE — 84702 CHORIONIC GONADOTROPIN TEST: CPT

## 2020-01-30 PROCEDURE — 76817 TRANSVAGINAL US OBSTETRIC: CPT

## 2020-01-30 PROCEDURE — 86900 BLOOD TYPING SEROLOGIC ABO: CPT

## 2020-01-30 NOTE — ED PROVIDER NOTE - OBJECTIVE STATEMENT
40 yo female M1 lmp unknown (hx of irregular periods). presenting 1 one day vaginal bleeding with clots. bright red associated with some cramping. denies lightheadedness or dizziness. noticed bleeding this morning while in shower. denies recent sexual activity. vomited x 4 times last evening. no fever chills. recently finished course of keflex for uti back in december. states that she had a sonogram at er visit in december   OBGYN HR

## 2020-01-30 NOTE — ED ADULT NURSE NOTE - NSIMPLEMENTINTERV_GEN_ALL_ED
Implemented All Universal Safety Interventions:  [place] to call system. Call bell, personal items and telephone within reach. Instruct patient to call for assistance. Room bathroom lighting operational. Non-slip footwear when patient is off stretcher. Physically safe environment: no spills, clutter or unnecessary equipment. Stretcher in lowest position, wheels locked, appropriate side rails in place.

## 2020-01-30 NOTE — ED ADULT TRIAGE NOTE - CHIEF COMPLAINT QUOTE
Patient alert and oriented x4, aprox 10 weeks preg per patient. c/o vaginal bleeding that started this morning while patient was in the shower. states that she also went through one pad this am with small amount of clots. denies lower abd pain or dysuria at this time. color normal for ethnicity. denies dizziness or fatigue.

## 2020-01-30 NOTE — ED PROVIDER NOTE - PATIENT PORTAL LINK FT
You can access the FollowMyHealth Patient Portal offered by [facility] by registering at the following website: [email or web address]. By joining [employer]’s FollowMyHealth portal, you will also be able to view your health information using other applications (apps) compatible with our system.

## 2020-01-30 NOTE — ED PROVIDER NOTE - CLINICAL SUMMARY MEDICAL DECISION MAKING FREE TEXT BOX
female M1 vaginal spotting with clots since this morning associated with cramping. labs urine and sono

## 2020-01-30 NOTE — ED PROVIDER NOTE - PROGRESS NOTE DETAILS
advised on results and importance of fu with OBGYN   advised on if worsening of bleeding cramping lightheadedness of dizziness to immediately return to the ER   pt verbalizes her understanding

## 2020-01-30 NOTE — ED PROVIDER NOTE - ATTENDING CONTRIBUTION TO CARE
39yoF;  @10 weeks; now p/w abd pain and vaginal bleeding x1 day.  denies cp/sob/palp/lh. denies f/c/s. +nausea x2-3 days. denies dysuria, hematuria, frequency, urgency.  EXAM:  General:     NAD, well-nourished, well-appearing  Head:     NC/AT, EOMI, oral mucosa moist  Neck:     trachea midline  Lungs:     CTA b/l, no w/r/r  CVS:     S1S2, RRR, no m/g/r  Abd:     +BS, s/nt/nd, no organomegaly  Ext:  king x4  Neuro: grossly intact  A/P:  39yoF p/w vaginal bleeding in preg  -labs, iv, us

## 2020-01-30 NOTE — ED ADULT NURSE NOTE - OBJECTIVE STATEMENT
Patient alert and oriented x4, 12 weeks pregnant. c/o vaginal bleeding that started this morning while patient was in the shower. states that she also went through one pad this am with small amount of clots. denies lower abd pain or dysuria at this time. color normal for ethnicity. denies dizziness or fatigue.

## 2020-02-01 RX ORDER — NITROFURANTOIN MACROCRYSTAL 50 MG
1 CAPSULE ORAL
Qty: 14 | Refills: 0
Start: 2020-02-01 | End: 2020-02-07

## 2020-02-01 NOTE — ED POST DISCHARGE NOTE - RESULT SUMMARY
+ urine culture- Rx sent to pharmacy ([facility]) Pt pregnant and recently completed Keflex

## 2020-02-20 ENCOUNTER — ASOB RESULT (OUTPATIENT)
Age: 40
End: 2020-02-20

## 2020-02-20 ENCOUNTER — APPOINTMENT (OUTPATIENT)
Dept: ANTEPARTUM | Facility: CLINIC | Age: 40
End: 2020-02-20
Payer: COMMERCIAL

## 2020-02-20 ENCOUNTER — APPOINTMENT (OUTPATIENT)
Dept: MATERNAL FETAL MEDICINE | Facility: CLINIC | Age: 40
End: 2020-02-20
Payer: COMMERCIAL

## 2020-02-20 VITALS
HEART RATE: 81 BPM | OXYGEN SATURATION: 98 % | BODY MASS INDEX: 28.8 KG/M2 | WEIGHT: 156.5 LBS | SYSTOLIC BLOOD PRESSURE: 108 MMHG | HEIGHT: 62 IN | RESPIRATION RATE: 18 BRPM | DIASTOLIC BLOOD PRESSURE: 72 MMHG

## 2020-02-20 DIAGNOSIS — Z83.3 FAMILY HISTORY OF DIABETES MELLITUS: ICD-10-CM

## 2020-02-20 DIAGNOSIS — Z87.42 PERSONAL HISTORY OF OTHER DISEASES OF THE FEMALE GENITAL TRACT: ICD-10-CM

## 2020-02-20 PROCEDURE — 76815 OB US LIMITED FETUS(S): CPT

## 2020-02-20 PROCEDURE — 99243 OFF/OP CNSLTJ NEW/EST LOW 30: CPT

## 2020-02-20 NOTE — DISCUSSION/SUMMARY
[FreeTextEntry1] : The patient is a 39-year-old  012 being seen at approximately 15 weeks for advanced maternal age, previous C/S and maternal hypothyroidism. Her obstetrical history is significant for delivery in  a liveborn female  weighing 7 lbs. 9 oz. delivered via  section at 40 weeks for failed induction. Patient was induced secondary to elevated blood pressure. Subsequent pregnancy in  she had a male  weighing 9 lbs. 0 oz. delivered by repeat  section done electively at 39 weeks. Patient denies any problems with that pregnancy. In addition she has had one first trimester miscarriage which required D&C. She was diagnosed with hypothyroidism approximately 20 years ago and is on 0.112 mcg of Synthroid daily. She is being followed by her PCP for this medical problems.\par \par An ultrasound was performed today which reveals a single viable intrauterine gestation with size consistent with dates. No gross or soft markers associated with fetal aneuploidy are noted. Vital signs today reveal blood pressure 108/72, maternal weight is 156.8 pounds consistent with a BMI of 28.62KG.\par \par The patient states that she has not had any genetic testing during this pregnancy. A quad screen was drawn today. She was also advised to have genetic consultation to discuss the use of noninterventional prenatal screening for genetic analysis.  An appointment is scheduled for next week. Blood work that was performed in your office on  reveals a TSH of 14.70, T3 uptake of 23.6 which is elevated and a free thyroid index of 1.7. The patient does complain of body temperature regulation issues. She was advised that she needs to increase her Synthroid and a prescription for 0.137 mcg was placed to the pharmacy. She should have thyroid function test performed in approximately 1 month. In addition a comprehensive ultrasound at 20 weeks is recommended. A growth scan at between 26 and 28 weeks was recommended. In addition a 3 hour glucose tolerance test between 24 and 28 weeks is recommended due to family history and previous LGA fetus. A hemoglobin A1c was obtained in our office today.  All of the above was discussed with the patient and all her questions were answered.\par \par Her mother has diabetes. She has hypothyroidism. She has had 2  sections and one D&C as well as a left knee arthroscopy for ACL repair. She has no known allergies to medications and denies alcohol, tobacco or drug use.\par \par I spent a total of 40 minutes of which greater than 50% was counseling and coordinating care.\par \par Recommendations;\par \par #1. Quad screen and hemoglobin A1c drawn today.\par #2. Genetic counseling scheduled for next week.\par #3. Synthroid increased to 0.137 mcg daily.\par #4. Repeat thyroid function tests in one month is recommended.\par #5. Comprehensive ultrasound in one month is recommended.\par #6. Three-hour glucose tolerance test between 24 and 28 weeks is recommended.\par #7. Followup maternal fetal medicine consultation as clinically indicated.

## 2020-02-21 LAB
ESTIMATED AVERAGE GLUCOSE: 103 MG/DL
HBA1C MFR BLD HPLC: 5.2 %

## 2020-02-25 LAB
2ND TRIMESTER DATA: NORMAL
ADDENDUM DOC: NORMAL
AFP PNL SERPL: NORMAL
AFP SERPL-ACNC: NORMAL
B-HCG FREE SERPL-MCNC: NORMAL
CLINICAL BIOCHEMIST REVIEW: ABNORMAL
CLINICAL BIOCHEMIST REVIEW: ABNORMAL
CLINICAL BIOCHEMIST REVIEW: NORMAL
INHIBIN A SERPL-MCNC: NORMAL
Lab: NORMAL
NOTES NTD: NORMAL
U ESTRIOL SERPL-SCNC: NORMAL

## 2020-02-28 ENCOUNTER — APPOINTMENT (OUTPATIENT)
Dept: MATERNAL FETAL MEDICINE | Facility: CLINIC | Age: 40
End: 2020-02-28
Payer: COMMERCIAL

## 2020-02-28 ENCOUNTER — APPOINTMENT (OUTPATIENT)
Dept: ANTEPARTUM | Facility: CLINIC | Age: 40
End: 2020-02-28

## 2020-02-28 ENCOUNTER — ASOB RESULT (OUTPATIENT)
Age: 40
End: 2020-02-28

## 2020-02-28 PROCEDURE — 99241 OFFICE CONSULTATION NEW/ESTAB PATIENT 15 MIN: CPT

## 2020-03-20 ENCOUNTER — APPOINTMENT (OUTPATIENT)
Dept: MATERNAL FETAL MEDICINE | Facility: CLINIC | Age: 40
End: 2020-03-20
Payer: COMMERCIAL

## 2020-03-20 ENCOUNTER — APPOINTMENT (OUTPATIENT)
Dept: ANTEPARTUM | Facility: CLINIC | Age: 40
End: 2020-03-20
Payer: COMMERCIAL

## 2020-03-20 ENCOUNTER — ASOB RESULT (OUTPATIENT)
Age: 40
End: 2020-03-20

## 2020-03-20 VITALS
DIASTOLIC BLOOD PRESSURE: 60 MMHG | BODY MASS INDEX: 28.71 KG/M2 | SYSTOLIC BLOOD PRESSURE: 98 MMHG | HEART RATE: 86 BPM | OXYGEN SATURATION: 98 % | HEIGHT: 62 IN | RESPIRATION RATE: 16 BRPM | WEIGHT: 156 LBS

## 2020-03-20 VITALS
HEIGHT: 62 IN | WEIGHT: 156 LBS | HEART RATE: 86 BPM | RESPIRATION RATE: 16 BRPM | OXYGEN SATURATION: 98 % | BODY MASS INDEX: 28.71 KG/M2 | SYSTOLIC BLOOD PRESSURE: 98 MMHG | DIASTOLIC BLOOD PRESSURE: 60 MMHG

## 2020-03-20 DIAGNOSIS — O34.219 MATERNAL CARE FOR UNSPECIFIED TYPE SCAR FROM PREVIOUS CESAREAN DELIVERY: ICD-10-CM

## 2020-03-20 PROCEDURE — 76817 TRANSVAGINAL US OBSTETRIC: CPT

## 2020-03-20 PROCEDURE — 76811 OB US DETAILED SNGL FETUS: CPT

## 2020-03-20 PROCEDURE — 99215 OFFICE O/P EST HI 40 MIN: CPT

## 2020-03-20 RX ORDER — LEVOTHYROXINE SODIUM 112 UG/1
112 TABLET ORAL
Refills: 0 | Status: DISCONTINUED | COMMUNITY
End: 2020-03-20

## 2020-03-20 NOTE — FAMILY HISTORY
[Reported Family History Of Birth Defects] : no congenital heart defects [Donald-Sachs Carrier] : no Donald-Sachs [Family History] : no mental retardation/autism [Reported Family History Of Genetic Disease] : no history of child defect in child of baby father

## 2020-03-20 NOTE — OB HISTORY
[Pregnancy History] : patient received anesthesia [LMP: ___] : LMP: [unfilled] [FELICIA: ___] : FELICIA: [unfilled] [Spontaneous] : Spontaneous conception [Sonogram] : sonogram [at ___ wks] : at [unfilled] weeks [unknown] : the patient is unsure of the date of her LMP [EGA: ___ wks] : EGA: [unfilled] wks [___] : no pregnancy complications reported [FreeTextEntry1] : Her prenatal records were not available for my review.\par \par She had a maternal fetal medicine consultation on February 20, 2020 due to her advanced maternal age, and hypothyroidism. She was referred for genetic counseling and advised to have a quadruple screen test. Her hypothyroidism was reported as being treated by her primary care provider.\par \par A Quadruple screen test done on February 20, 2001 he reported an increased risk for Down syndrome and trisomy 18. The risk for open neural tube defects was reported to be low. \par \par A noninvasive prenatal screening test done on February 19 reported an increase risk for trisomy 21 and a low risk for trisomy 18 and trisomy 13. The fetal sex was reported to be female. [Normal Amount/Duration] : was abnormal [Spotting Between  Menses] : no spotting between menses [Regular Cycle Intervals] : periods have been irregular

## 2020-03-20 NOTE — DISCUSSION/SUMMARY
[FreeTextEntry1] : She is 19 weeks and 4 days gestation by her last menstrual period dates.\par \par Regarding her advanced maternal age, she had genetic counseling on 2020. The results of the quadruple screen test and the noninvasive screening test were discussed with her. She is aware of the increased risk for trisomy 21 and trisomy 18 reported on the quadruple screen test. She is also aware of the positive result for trisomy 21 reported on the noninvasive prenatal screen test. She declined prenatal diagnostic testing with a genetic amniocentesis. \par \par Regarding the suspected fetal trisomy 21 (Down syndrome), I explained that this is a malformation due to the presence of an extra chromosome number 21 or a translocation involving chromosome 21. She was told that Down syndrome individuals have distinguishing physical characteristics like short stature, a short neck with redundant skin on the nape, short broad hands, distinctive facial features, and hypotonia. I informed her that all individuals with Down syndrome have some degree of mental retardation. They also have delayed growth. They can have frequent respiratory infections and are at risk for developing leukemia. They are also at risk for hearing loss. The life expectancy for individuals with Down syndrome varies depending on the presence or absence of heart defects. The recurrence risk for couples having a child with Down syndrome is approximately 1 %. A history of other family members having a child with Down syndrome does not increase the risk of having a child with a chromosome abnormality as long as there is no familial translocation. Pregnancy management was discussed. Genetic amniocentesis was again discussed. Elective termination of pregnancy was discussed.  We also discussed continuing with the pregnancy and she has elected to continue with the pregnancy. \par \par I told her that advanced maternal age has been associated with a higher incidence of gestational diabetes, and preeclampsia /eclampsia. I also told her that advanced maternal age has been associated with an increased risk of stillbirth, and therefore, I recommend delivery during the 39 week of gestation in the event she has not given birth by 39 weeks of gestation. \par \par She had 2   sections and she was advised to have a repeat  section delivery.  I told her that multiple repeat  deliveries can result in serious maternal morbidity.  I told her that she is at risk for dense adhesions, more difficult surgery, placenta accreta, bowel injury, cystotomy, ureteral injury, ileus, hysterectomy, blood transfusions, intensive care unit admission, and  delivery.\par \par She told me that she was diagnosed with hypothyroidism approximately 17 years ago. Currently being treated with levothyroxine 137 mcg daily. Last thyroid function studies were done approximately 2 months ago.  I told her that pregnancies complicated by hypothyroidism are at an increased risk for miscarriages, congenital anomalies and stillbirths. She denies feeling tired or fatigue, having constipation, recent weight gain, feeling cold, and having dry skin.  She was advised to have serial thyroid function studies during pregnancy to document that she is euthyroid or adjust her medication. I ordered a free T4, free T3, and TSH level. She was advised to have a followup maternal fetal medicine visit in approximately 9 weeks.\par \par The estriol level in the quadruple screening test was equal or less than 0.5 MoM but above 0.25 MoM. She was told that these low levels have been associated with a 2 fold increase risk for low birthweight infants (< 5th percentile) and a 7 fold increase risk for fetal loss before 24 weeks gestation. I advised her to have serial ultrasound examinations during the third trimester to monitor fetal growth.\par \par

## 2020-03-20 NOTE — SURGICAL HISTORY
[Cysts] : cysts [Last Pap: ___] : Last Pap: [unfilled] [Fibroids] : no fibroids [Abn Paps] : no abnormal pap smears [Breast Disease] : no breast disease [STI's] : no STI's [Infertility] : no infertility [OC Use] : no OC use

## 2020-03-20 NOTE — VITALS
[LMP (date): ___] : LMP was on [unfilled] [FELICIA by LMP (date): ___] : The calculated FELICIA by LMP is [unfilled] [By LMP] : this is the final FELICIA [GA =___ Weeks] : which calculates to a GA of [unfilled] weeks [GA= ___ Days] : and [unfilled] day(s)

## 2020-03-23 LAB
T3FREE SERPL-MCNC: 1.8 PG/ML
T4 FREE SERPL-MCNC: 1.1 NG/DL
TSH SERPL-ACNC: 0.59 UIU/ML

## 2020-03-31 ENCOUNTER — APPOINTMENT (OUTPATIENT)
Dept: PEDIATRIC CARDIOLOGY | Facility: CLINIC | Age: 40
End: 2020-03-31
Payer: COMMERCIAL

## 2020-03-31 DIAGNOSIS — O28.8 OTHER ABNORMAL FINDINGS ON ANTENATAL SCREENING OF MOTHER: ICD-10-CM

## 2020-03-31 PROCEDURE — 99204 OFFICE O/P NEW MOD 45 MIN: CPT | Mod: 25

## 2020-03-31 PROCEDURE — 76825 ECHO EXAM OF FETAL HEART: CPT

## 2020-03-31 PROCEDURE — 76821 MIDDLE CEREBRAL ARTERY ECHO: CPT

## 2020-03-31 PROCEDURE — 76827 ECHO EXAM OF FETAL HEART: CPT

## 2020-03-31 PROCEDURE — 93325 DOPPLER ECHO COLOR FLOW MAPG: CPT | Mod: 59

## 2020-03-31 PROCEDURE — 76820 UMBILICAL ARTERY ECHO: CPT

## 2020-03-31 RX ORDER — NITROFURANTOIN (MONOHYDRATE/MACROCRYSTALS) 25; 75 MG/1; MG/1
100 CAPSULE ORAL
Qty: 14 | Refills: 0 | Status: DISCONTINUED | COMMUNITY
Start: 2020-02-01

## 2020-03-31 RX ORDER — OSELTAMIVIR PHOSPHATE 75 MG/1
75 CAPSULE ORAL
Qty: 10 | Refills: 0 | Status: DISCONTINUED | COMMUNITY
Start: 2020-03-20

## 2020-03-31 RX ORDER — CEPHALEXIN 500 MG/1
500 CAPSULE ORAL
Qty: 10 | Refills: 0 | Status: DISCONTINUED | COMMUNITY
Start: 2019-12-27

## 2020-05-18 ENCOUNTER — APPOINTMENT (OUTPATIENT)
Dept: ANTEPARTUM | Facility: CLINIC | Age: 40
End: 2020-05-18

## 2020-05-20 ENCOUNTER — ASOB RESULT (OUTPATIENT)
Age: 40
End: 2020-05-20

## 2020-05-20 ENCOUNTER — APPOINTMENT (OUTPATIENT)
Dept: MATERNAL FETAL MEDICINE | Facility: CLINIC | Age: 40
End: 2020-05-20
Payer: COMMERCIAL

## 2020-05-20 ENCOUNTER — APPOINTMENT (OUTPATIENT)
Dept: ANTEPARTUM | Facility: CLINIC | Age: 40
End: 2020-05-20
Payer: COMMERCIAL

## 2020-05-20 VITALS
OXYGEN SATURATION: 98 % | DIASTOLIC BLOOD PRESSURE: 68 MMHG | HEART RATE: 92 BPM | HEIGHT: 62 IN | BODY MASS INDEX: 29.83 KG/M2 | WEIGHT: 162.13 LBS | SYSTOLIC BLOOD PRESSURE: 112 MMHG | RESPIRATION RATE: 18 BRPM

## 2020-05-20 VITALS
WEIGHT: 162.13 LBS | TEMPERATURE: 98.1 F | SYSTOLIC BLOOD PRESSURE: 112 MMHG | DIASTOLIC BLOOD PRESSURE: 68 MMHG | OXYGEN SATURATION: 98 % | HEIGHT: 62 IN | HEART RATE: 92 BPM | RESPIRATION RATE: 18 BRPM | BODY MASS INDEX: 29.83 KG/M2

## 2020-05-20 VITALS — TEMPERATURE: 98.1 F

## 2020-05-20 DIAGNOSIS — O09.523 SUPERVISION OF ELDERLY MULTIGRAVIDA, THIRD TRIMESTER: ICD-10-CM

## 2020-05-20 PROCEDURE — 76816 OB US FOLLOW-UP PER FETUS: CPT

## 2020-05-20 PROCEDURE — 99214 OFFICE O/P EST MOD 30 MIN: CPT

## 2020-05-20 NOTE — OB HISTORY
[Pregnancy History] : patient received anesthesia [FELICIA: ___] : FELICIA: [unfilled] [LMP: ___] : LMP: [unfilled] [Spontaneous] : Spontaneous conception [Sonogram] : sonogram [at ___ wks] : at [unfilled] weeks [unknown] : the patient is unsure of the date of her LMP [___] : no pregnancy complications reported [FreeTextEntry1] : Her prenatal records were not available for my review.\par \par She had a maternal fetal medicine consultation on February 20, 2020 due to her advanced maternal age, and hypothyroidism. She was referred for genetic counseling and advised to have a quadruple screen test. Her hypothyroidism was reported as being treated by her primary care provider.\par \par A Quadruple screen test done on February 20, 2001 he reported an increased risk for Down syndrome and trisomy 18. The risk for open neural tube defects was reported to be low. \par \par A noninvasive prenatal screening test done on February 19 reported an increase risk for trisomy 21 and a low risk for trisomy 18 and trisomy 13. The fetal sex was reported to be female.\par \par She had a follow up maternal fetal medicine consultation with me on March 20, 2020.\par  [EGA: ___ wks] : EGA: [unfilled] wks [Spotting Between  Menses] : no spotting between menses [Normal Amount/Duration] : was abnormal [Regular Cycle Intervals] : periods have been irregular

## 2020-05-20 NOTE — DISCUSSION/SUMMARY
[FreeTextEntry1] : She is 28 weeks and 2 days gestation by her last menstrual period dates.\par \par Regarding her hypothyroidism , she is currently being treated with levothyroxine 137 mcg daily. She has not had thyroid function studies since March 20, 2020. The thyroid function study were noted to be within normal limits. She denies feeling tired or fatigue, having constipation, recent weight gain, feeling cold, and having dry skin.  She was again advised to have serial thyroid function studies during pregnancy to document that she is euthyroid or adjust her medication. I ordered a free T4, free T3, and TSH level. She was advised to have a followup maternal fetal medicine visit in approximately 4 weeks.\par \par Regarding the low estriol level in the quadruple screening test, I told her that today's ultrasound examination reported the estimated fetal weight to be 2 lbs. 6 oz. or 1080 g which is at the 14th percentile for gestational age. She was scheduled to have an ultrasound examination in 4 weeks to monitor fetal growth.\par \par

## 2020-05-20 NOTE — SURGICAL HISTORY
[Cysts] : cysts [Last Pap: ___] : Last Pap: [unfilled] [Fibroids] : no fibroids [Breast Disease] : no breast disease [Abn Paps] : no abnormal pap smears [STI's] : no STI's [Infertility] : no infertility [OC Use] : no OC use

## 2020-05-20 NOTE — ACTIVE PROBLEMS
[Diabetes Mellitus] : no diabetes mellitus [Heart Disease] : no heart disease [Hypertension] : no hypertension [Renal Disease] : no kidney disease, no UTI [Autoimmune Disease] : no autoimmune disease [Neurologic Disorder] : no neurologic disorder, no epilepsy [Psychiatric Disorders] : no psychiatric disorders [Depression] : no depression, no post partum depression [Hepatic Disorder] : no hepatitis, no liver disease [Thrombophlebitis] : no varicosities, no phlebitis [Trauma] : no trauma/violence [Blood Transfusion (___ Ml)] : no history of blood transfusion

## 2020-05-22 LAB
T3FREE SERPL-MCNC: 2.11 PG/ML
T4 FREE SERPL-MCNC: 0.9 NG/DL
TSH SERPL-ACNC: 1.62 UIU/ML

## 2020-06-17 ENCOUNTER — APPOINTMENT (OUTPATIENT)
Dept: MATERNAL FETAL MEDICINE | Facility: CLINIC | Age: 40
End: 2020-06-17

## 2020-06-17 ENCOUNTER — APPOINTMENT (OUTPATIENT)
Dept: ANTEPARTUM | Facility: CLINIC | Age: 40
End: 2020-06-17

## 2020-06-24 ENCOUNTER — APPOINTMENT (OUTPATIENT)
Dept: MATERNAL FETAL MEDICINE | Facility: CLINIC | Age: 40
End: 2020-06-24
Payer: COMMERCIAL

## 2020-06-24 ENCOUNTER — APPOINTMENT (OUTPATIENT)
Dept: ANTEPARTUM | Facility: CLINIC | Age: 40
End: 2020-06-24
Payer: COMMERCIAL

## 2020-06-24 ENCOUNTER — ASOB RESULT (OUTPATIENT)
Age: 40
End: 2020-06-24

## 2020-06-24 VITALS
BODY MASS INDEX: 31.2 KG/M2 | OXYGEN SATURATION: 98 % | WEIGHT: 169.56 LBS | RESPIRATION RATE: 16 BRPM | HEIGHT: 62 IN | HEART RATE: 87 BPM | DIASTOLIC BLOOD PRESSURE: 62 MMHG | SYSTOLIC BLOOD PRESSURE: 100 MMHG

## 2020-06-24 PROCEDURE — 76820 UMBILICAL ARTERY ECHO: CPT

## 2020-06-24 PROCEDURE — 76816 OB US FOLLOW-UP PER FETUS: CPT

## 2020-06-24 PROCEDURE — 93976 VASCULAR STUDY: CPT

## 2020-06-24 PROCEDURE — 76819 FETAL BIOPHYS PROFIL W/O NST: CPT

## 2020-06-24 PROCEDURE — 99214 OFFICE O/P EST MOD 30 MIN: CPT

## 2020-06-24 NOTE — VITALS
[LMP (date): ___] : LMP was on [unfilled] [GA =___ Weeks] : which calculates to a GA of [unfilled] weeks [GA= ___ Days] : and [unfilled] day(s) [FELICIA by LMP (date): ___] : The calculated FELICIA by LMP is [unfilled] [By LMP] : this is the final FELICIA

## 2020-06-24 NOTE — DISCUSSION/SUMMARY
[FreeTextEntry1] : She is 33 weeks and 2 days gestation by her last menstrual period dates.\par \par Regarding her hypothyroidism , she is currently being treated with levothyroxine 137 mcg daily. I reviewed the thyroid function studies done 5/20/20. The thyroid function study were noted to be within normal limits. She is feeling tired or fatigue which she attributes to her work and pregnancy. She dinies having constipation, recent weight gain, feeling cold, and having dry skin.  She was again advised to have serial thyroid function studies during pregnancy to document that she is euthyroid or adjust her medication. I ordered a free T4, free T3, and TSH level. She was advised to have a followup maternal fetal medicine visit in approximately 4 weeks.\par \par Regarding the low estriol level in the quadruple screening test, I told her that today's ultrasound examination reported the estimated fetal weight to be 4 lbs. 4 oz. or 1940 g which is at the 17th percentile for gestational age. She was scheduled to have an ultrasound examination in 4 weeks to monitor fetal growth.\par \par

## 2020-06-24 NOTE — OB HISTORY
[Pregnancy History] : patient received anesthesia [FELICIA: ___] : FELICIA: [unfilled] [LMP: ___] : LMP: [unfilled] [Spontaneous] : Spontaneous conception [Sonogram] : sonogram [EGA: ___ wks] : EGA: [unfilled] wks [at ___ wks] : at [unfilled] weeks [unknown] : the patient is unsure of the date of her LMP [___] : no pregnancy complications reported [Normal Amount/Duration] : was abnormal [Spotting Between  Menses] : no spotting between menses [FreeTextEntry1] : Her prenatal records were not available for my review.\par \par She had a maternal fetal medicine consultation on February 20, 2020 due to her advanced maternal age, and hypothyroidism. She was referred for genetic counseling and advised to have a quadruple screen test. Her hypothyroidism was reported as being treated by her primary care provider.\par \par A Quadruple screen test done on February 20, 2001 he reported an increased risk for Down syndrome and trisomy 18. The risk for open neural tube defects was reported to be low. \par \par A noninvasive prenatal screening test done on February 19 reported an increase risk for trisomy 21 and a low risk for trisomy 18 and trisomy 13. The fetal sex was reported to be female.\par \par She had a follow up maternal fetal medicine consultations with me on March 20 and May 20, 2020.\par  [Regular Cycle Intervals] : periods have been irregular

## 2020-06-24 NOTE — PAST MEDICAL HISTORY
[HIV Infection] : no HIV [Exposure To Gonorrhea] : no gonorrhea [Human Papilloma Virus Infection] : no genital warts [Chlamydial Infections] : no chlamydia [Syphilis] : no syphilis [Herpes Simplex] : no genital herpes [Hepatitis, B Virus] : no Hepatitis B [Hepatitis, C Virus] : no Hepatitis C [Trichomoniasis] : no trichomoniasis

## 2020-06-24 NOTE — ACTIVE PROBLEMS
[Diabetes Mellitus] : no diabetes mellitus [Hypertension] : no hypertension [Heart Disease] : no heart disease [Renal Disease] : no kidney disease, no UTI [Autoimmune Disease] : no autoimmune disease [Neurologic Disorder] : no neurologic disorder, no epilepsy [Psychiatric Disorders] : no psychiatric disorders [Depression] : no depression, no post partum depression [Hepatic Disorder] : no hepatitis, no liver disease [Thrombophlebitis] : no varicosities, no phlebitis [Trauma] : no trauma/violence [Blood Transfusion (___ Ml)] : no history of blood transfusion

## 2020-06-24 NOTE — FAMILY HISTORY
[Reported Family History Of Birth Defects] : no neural tube defect [Donald-Sachs Carrier] : no Donald-Sachs [Family History] : no mental retardation/autism [Reported Family History Of Genetic Disease] : no maternal metabolic disorder

## 2020-06-24 NOTE — SURGICAL HISTORY
[Cysts] : cysts [Last Pap: ___] : Last Pap: [unfilled] [Fibroids] : no fibroids [STI's] : no STI's [Abn Paps] : no abnormal pap smears [Breast Disease] : no breast disease [OC Use] : no OC use [Infertility] : no infertility

## 2020-06-25 LAB
T3FREE SERPL-MCNC: 2 PG/ML
T4 FREE SERPL-MCNC: 0.7 NG/DL
TSH SERPL-ACNC: 14.5 UIU/ML

## 2020-07-22 ENCOUNTER — APPOINTMENT (OUTPATIENT)
Dept: ANTEPARTUM | Facility: CLINIC | Age: 40
End: 2020-07-22
Payer: COMMERCIAL

## 2020-07-22 ENCOUNTER — ASOB RESULT (OUTPATIENT)
Age: 40
End: 2020-07-22

## 2020-07-22 ENCOUNTER — APPOINTMENT (OUTPATIENT)
Dept: MATERNAL FETAL MEDICINE | Facility: CLINIC | Age: 40
End: 2020-07-22
Payer: COMMERCIAL

## 2020-07-22 VITALS
OXYGEN SATURATION: 98 % | RESPIRATION RATE: 18 BRPM | DIASTOLIC BLOOD PRESSURE: 84 MMHG | HEIGHT: 62 IN | SYSTOLIC BLOOD PRESSURE: 124 MMHG | BODY MASS INDEX: 32.29 KG/M2 | HEART RATE: 77 BPM | WEIGHT: 175.44 LBS

## 2020-07-22 DIAGNOSIS — O28.1 ABNORMAL BIOCHEMICAL FINDING ON ANTENATAL SCREENING OF MOTHER: ICD-10-CM

## 2020-07-22 PROCEDURE — 93976 VASCULAR STUDY: CPT

## 2020-07-22 PROCEDURE — 76818 FETAL BIOPHYS PROFILE W/NST: CPT

## 2020-07-22 PROCEDURE — 76821 MIDDLE CEREBRAL ARTERY ECHO: CPT | Mod: 59

## 2020-07-22 PROCEDURE — 76816 OB US FOLLOW-UP PER FETUS: CPT

## 2020-07-22 PROCEDURE — 76820 UMBILICAL ARTERY ECHO: CPT

## 2020-07-22 PROCEDURE — 99214 OFFICE O/P EST MOD 30 MIN: CPT

## 2020-07-22 NOTE — PAST MEDICAL HISTORY
[HIV Infection] : no HIV [Exposure To Gonorrhea] : no gonorrhea [Chlamydial Infections] : no chlamydia [Syphilis] : no syphilis [Herpes Simplex] : no genital herpes [Hepatitis, B Virus] : no Hepatitis B [Human Papilloma Virus Infection] : no genital warts [Hepatitis, C Virus] : no Hepatitis C [Trichomoniasis] : no trichomoniasis

## 2020-07-22 NOTE — OB HISTORY
[Pregnancy History] : patient received anesthesia [LMP: ___] : LMP: [unfilled] [FELICIA: ___] : FELICIA: [unfilled] [EGA: ___ wks] : EGA: [unfilled] wks [Spontaneous] : Spontaneous conception [at ___ wks] : at [unfilled] weeks [Sonogram] : sonogram [unknown] : the patient is unsure of the date of her LMP [___] : no pregnancy complications reported [FreeTextEntry1] : Her prenatal records were not available for my review.\par \par She had a maternal fetal medicine consultation on February 20, 2020 due to her advanced maternal age, and hypothyroidism. She was referred for genetic counseling and advised to have a quadruple screen test. Her hypothyroidism was reported as being treated by her primary care provider.\par \par A Quadruple screen test done on February 20, 2001 he reported an increased risk for Down syndrome and trisomy 18. The risk for open neural tube defects was reported to be low. \par \par A noninvasive prenatal screening test done on February 19 reported an increase risk for trisomy 21 and a low risk for trisomy 18 and trisomy 13. The fetal sex was reported to be female.\par \par She had a follow up maternal fetal medicine consultations with me on March 20, May 20, and June 24, 2020.\par  [Normal Amount/Duration] : was abnormal [Regular Cycle Intervals] : periods have been irregular [Spotting Between  Menses] : no spotting between menses

## 2020-07-22 NOTE — ACTIVE PROBLEMS
[Diabetes Mellitus] : no diabetes mellitus [Autoimmune Disease] : no autoimmune disease [Hypertension] : no hypertension [Heart Disease] : no heart disease [Neurologic Disorder] : no neurologic disorder, no epilepsy [Psychiatric Disorders] : no psychiatric disorders [Renal Disease] : no kidney disease, no UTI [Hepatic Disorder] : no hepatitis, no liver disease [Depression] : no depression, no post partum depression [Thrombophlebitis] : no varicosities, no phlebitis [Blood Transfusion (___ Ml)] : no history of blood transfusion [Trauma] : no trauma/violence

## 2020-07-22 NOTE — SURGICAL HISTORY
[Cysts] : cysts [Last Pap: ___] : Last Pap: [unfilled] [Abn Paps] : no abnormal pap smears [Fibroids] : no fibroids [Infertility] : no infertility [STI's] : no STI's [Breast Disease] : no breast disease [OC Use] : no OC use

## 2020-07-22 NOTE — DISCUSSION/SUMMARY
[FreeTextEntry1] : She is 37 weeks and 2 days gestation by her last menstrual period dates.\par \par Regarding her hypothyroidism, she is currently being treated with levothyroxine 137 mcg daily. The hypothyroidism is being monitored by her primary care provider. I reviewed the thyroid function studies done 6/24/20. The thyroid function studies were noted to have a low free T4 level of 0.7 and an increased TSH level of 14.50. She states that she continues to feel tired or fatigue which she attributes to her work and pregnancy. She denies having constipation, recent weight gain,feeling cold, and having dry skin. I recommended increasing the levothyroxine dose to 150 mcg daily. She was given a prescription for the levothyroxine dosage. I also ordered a free T4, free T3, and TSH level. \par \par Regarding the low estriol level in the quadruple screening test, I told her that today's ultrasound examination reported the estimated fetal weight to be 5 lbs. 13 oz. or 2645 g which is at the 14th percentile for gestational age. She was advised to have weekly fetal testing until delivery.\par

## 2020-07-23 ENCOUNTER — APPOINTMENT (OUTPATIENT)
Dept: ENDOCRINOLOGY | Facility: CLINIC | Age: 40
End: 2020-07-23
Payer: COMMERCIAL

## 2020-07-23 VITALS
HEIGHT: 62 IN | WEIGHT: 172 LBS | BODY MASS INDEX: 31.65 KG/M2 | HEART RATE: 91 BPM | DIASTOLIC BLOOD PRESSURE: 84 MMHG | SYSTOLIC BLOOD PRESSURE: 126 MMHG | OXYGEN SATURATION: 98 %

## 2020-07-23 DIAGNOSIS — Z3A.37 37 WEEKS GESTATION OF PREGNANCY: ICD-10-CM

## 2020-07-23 PROCEDURE — 99204 OFFICE O/P NEW MOD 45 MIN: CPT

## 2020-07-23 RX ORDER — LEVOTHYROXINE SODIUM 0.14 MG/1
137 TABLET ORAL DAILY
Refills: 0 | Status: DISCONTINUED | COMMUNITY
Start: 2020-06-25 | End: 2020-07-23

## 2020-07-23 NOTE — ASSESSMENT
[FreeTextEntry1] : Ms. JO BAEZA is a 40 yo G 4 P  ,who I was asked to see in consultation for evaluation  hypothyroidism.\par Current Pregnancy: LMP: 2019. FELICIA: 8/10/2020. EGA: 38 wks. \par \par Currently on levothyroxine 150mcg daily, dose increased from 137 mcg 2 weeks ago, the blood work was too soon, it usually takes 4 to 6 weeks to see the effect of dose change.\par Recent TSH  was 124 now 4.57 still  mildly high, but blood work was too soon, goal TSH for pregnancy is below 2.5.\par \par To continue same dose for now.\par Discussed with patient that she is very close to deliver , and probably after that her requirements might drop.\par So to get repeat blood work in  6 weeks after deliver and will adjust dose then as needed.\par \par \par \par Education:\par When hypothyroid women become pregnant and maintain the pregnancy, they carry an increased risk for early and late obstetrical complications to include Maternal infertility, miscarriage, anemia in pregnancy, preeclampsia, abruptio placenta and postpartum hemorrhage and in fetal; increased fetal death rate,  birth, low birth weight, increased  respiratory distress and mpaired neurointellectual child development. \par \par Adequate treatment with thyroid hormone greatly reduces risks of a poorer obstetrical outcome. The adequacy of thyroxine treatment is critical to the outcome and adequate treatment of overt and subclinical hypothyroidism minimizes the risks of  and premature delivery. In hypothyroid pregnant women receiving adequate treatment, the frequency of abortions is minimal and pregnancies are carried to term without complications .  In euthyroid thyroid antibody positive pregnant women who were treated with thyroxine the rates of miscarriage and pre-term delivery are lower than euthyroid antibody positive women who did not receive thyroxine treatment.\par \par Administration of L-thyroxine is the treatment of choice for maternal hypothyroidism, \par A number of studies have indicated that during pregnancy thyroxine requirements increase during gestation. Women who already take thyroxine before pregnancy usually need to increase their daily dosage by 30-50%, on average, above preconception dosage.  To avoid the risk of maternal hypothyroidism during early gestation it has been suggested to adjust the preconception thyroxine dose with the aim to maintain serum TSH near the low-normal range. The use of levothyroxine in subclinical hypothyroidism is may reduce  birth and miscarriage.\par \par \par RTC in 2  months with blood work

## 2020-07-23 NOTE — HISTORY OF PRESENT ILLNESS
[FreeTextEntry1] : Ms. JO BAEZA is a 40 yo G 4 P 2010 ,who I was asked to see in consultation for evaluation  hypothyroidism.\par Current Pregnancy: LMP: 11/4/2019. FELICIA: 8/6/2020. EGA: 38 wks. \par Per patient she was diagnosed  few years ago.\par Has been on replacement since then.She says she was on 112 mcg daily before her pregnancy.\par Currently on levothyroxine 150 mcg daily dose increased from 137 mcg daily 2 weeks ago .\par Takes it empty stomach in morning, denies missing any doses. \par No family h/o thyroid disorder.\par Appropriate weight gain, feels good baby movements.\par Complains of heat  intolerance, no constipation or diarrhea, no palpitations, energy level fair, no skin or hair changes.\par \par

## 2020-07-23 NOTE — REVIEW OF SYSTEMS
[Fatigue] : no fatigue [Decreased Appetite] : appetite not decreased [Visual Field Defect] : no visual field defect [Eye Pain] : no pain [Dry Eyes] : no dryness [Dysphagia] : no dysphagia [Neck Pain] : no neck pain [Chest Pain] : no chest pain [Dysphonia] : no dysphonia [Palpitations] : no palpitations [Shortness Of Breath] : no shortness of breath [Lower Ext Edema] : no lower extremity edema [Cough] : no cough [Orthopnea] : no orthopnea [Vomiting] : no vomiting [Nausea] : no nausea [Constipation] : no constipation [Gas/Bloating] : no gas/bloating [Diarrhea] : no diarrhea [Polyuria] : no polyuria [Muscle Weakness] : no muscle weakness [Joint Pain] : no joint pain [Dysuria] : no dysuria [Muscle Cramps] : no muscle cramps [Acne] : no acne [Acanthosis] : no acanthosis  [Dizziness] : no dizziness [Headaches] : no headaches [Dry Skin] : no dry skin [Tremors] : no tremors [Suicidal Ideation] : no suicidal ideation [Depression] : no depression [Insomnia] : no insomnia [Anxiety] : no anxiety [Cold Intolerance] : no cold intolerance [Polydipsia] : no polydipsia [Easy Bleeding] : no ~M tendency for easy bleeding [Heat Intolerance] : no heat intolerance [Easy Bruising] : no tendency for easy bruising [FreeTextEntry2] : Appropriate weight gain for pregnancy

## 2020-07-23 NOTE — PHYSICAL EXAM
[Alert] : alert [No Acute Distress] : no acute distress [Well Nourished] : well nourished [No Lid Lag] : no lid lag [Normal Sclera/Conjunctiva] : normal sclera/conjunctiva [No Proptosis] : no proptosis [Thyroid Not Enlarged] : the thyroid was not enlarged [No Neck Mass] : no neck mass was observed [Supple] : the neck was supple [No Accessory Muscle Use] : no accessory muscle use [No Respiratory Distress] : no respiratory distress [No Thyroid Nodules] : no palpable thyroid nodules [Normal S1, S2] : normal S1 and S2 [Normal Rate and Effort] : normal respiratory rate and effort [Clear to Auscultation] : lungs were clear to auscultation bilaterally [Normal Rate] : heart rate was normal [No Murmurs] : no murmurs [Regular Rhythm] : with a regular rhythm [Normal Bowel Sounds] : normal bowel sounds [Not Tender] : non-tender [Soft] : abdomen soft [Not Distended] : not distended [No Masses] : no abdominal mass palpated [Normal Supraclavicular Nodes] : no supraclavicular lymphadenopathy [Normal Gait] : normal gait [Normal Anterior Cervical Nodes] : no anterior cervical lymphadenopathy [No Stigmata of Cushings Syndrome] : no stigmata of Cushings Syndrome [No Rash] : no rash [No Clubbing, Cyanosis] : no clubbing  or cyanosis of the fingernails [No Joint Swelling] : no joint swelling seen [No Skin Lesions] : no skin lesions [No Motor Deficits] : the motor exam was normal [No Tremors] : no tremors [Oriented x3] : oriented to person, place, and time [No Sensory Deficits] : the sensory exam was normal to light touch and pinprick [Normal Insight/Judgement] : insight and judgment were intact [Normal Affect] : the affect was normal [Normal Mood] : the mood was normal [Acanthosis Nigricans] : no acanthosis nigricans [de-identified] : Gravid

## 2020-07-24 LAB
T3FREE SERPL-MCNC: 2.11 PG/ML
T4 FREE SERPL-MCNC: 1 NG/DL
TSH SERPL-ACNC: 4.57 UIU/ML

## 2020-07-29 ENCOUNTER — APPOINTMENT (OUTPATIENT)
Dept: ANTEPARTUM | Facility: CLINIC | Age: 40
End: 2020-07-29

## 2020-07-30 ENCOUNTER — TRANSCRIPTION ENCOUNTER (OUTPATIENT)
Age: 40
End: 2020-07-30

## 2020-07-31 ENCOUNTER — APPOINTMENT (OUTPATIENT)
Dept: ANTEPARTUM | Facility: CLINIC | Age: 40
End: 2020-07-31
Payer: COMMERCIAL

## 2020-07-31 ENCOUNTER — INPATIENT (INPATIENT)
Facility: HOSPITAL | Age: 40
LOS: 2 days | Discharge: HOME | End: 2020-08-03
Attending: OBSTETRICS & GYNECOLOGY | Admitting: OBSTETRICS & GYNECOLOGY
Payer: COMMERCIAL

## 2020-07-31 ENCOUNTER — TRANSCRIPTION ENCOUNTER (OUTPATIENT)
Age: 40
End: 2020-07-31

## 2020-07-31 ENCOUNTER — RESULT REVIEW (OUTPATIENT)
Age: 40
End: 2020-07-31

## 2020-07-31 ENCOUNTER — ASOB RESULT (OUTPATIENT)
Age: 40
End: 2020-07-31

## 2020-07-31 VITALS — SYSTOLIC BLOOD PRESSURE: 127 MMHG | HEART RATE: 87 BPM | DIASTOLIC BLOOD PRESSURE: 98 MMHG

## 2020-07-31 DIAGNOSIS — O34.219 MATERNAL CARE FOR UNSPECIFIED TYPE SCAR FROM PREVIOUS CESAREAN DELIVERY: ICD-10-CM

## 2020-07-31 LAB
ALBUMIN SERPL ELPH-MCNC: 3.9 G/DL — SIGNIFICANT CHANGE UP (ref 3.3–5.2)
ALP SERPL-CCNC: 126 U/L — HIGH (ref 40–120)
ALT FLD-CCNC: 12 U/L — SIGNIFICANT CHANGE UP
ANION GAP SERPL CALC-SCNC: 14 MMOL/L — SIGNIFICANT CHANGE UP (ref 5–17)
AST SERPL-CCNC: 18 U/L — SIGNIFICANT CHANGE UP
BASOPHILS # BLD AUTO: 0.04 K/UL — SIGNIFICANT CHANGE UP (ref 0–0.2)
BASOPHILS NFR BLD AUTO: 0.4 % — SIGNIFICANT CHANGE UP (ref 0–2)
BILIRUB SERPL-MCNC: <0.2 MG/DL — LOW (ref 0.4–2)
BLD GP AB SCN SERPL QL: SIGNIFICANT CHANGE UP
BUN SERPL-MCNC: 10 MG/DL — SIGNIFICANT CHANGE UP (ref 8–20)
CALCIUM SERPL-MCNC: 9.3 MG/DL — SIGNIFICANT CHANGE UP (ref 8.6–10.2)
CHLORIDE SERPL-SCNC: 99 MMOL/L — SIGNIFICANT CHANGE UP (ref 98–107)
CO2 SERPL-SCNC: 21 MMOL/L — LOW (ref 22–29)
CREAT SERPL-MCNC: 0.47 MG/DL — LOW (ref 0.5–1.3)
EOSINOPHIL # BLD AUTO: 0.07 K/UL — SIGNIFICANT CHANGE UP (ref 0–0.5)
EOSINOPHIL NFR BLD AUTO: 0.7 % — SIGNIFICANT CHANGE UP (ref 0–6)
GLUCOSE SERPL-MCNC: 78 MG/DL — SIGNIFICANT CHANGE UP (ref 70–99)
HCT VFR BLD CALC: 42.6 % — SIGNIFICANT CHANGE UP (ref 34.5–45)
HGB BLD-MCNC: 14.2 G/DL — SIGNIFICANT CHANGE UP (ref 11.5–15.5)
IMM GRANULOCYTES NFR BLD AUTO: 1 % — SIGNIFICANT CHANGE UP (ref 0–1.5)
LYMPHOCYTES # BLD AUTO: 1.75 K/UL — SIGNIFICANT CHANGE UP (ref 1–3.3)
LYMPHOCYTES # BLD AUTO: 18.1 % — SIGNIFICANT CHANGE UP (ref 13–44)
MCHC RBC-ENTMCNC: 29.5 PG — SIGNIFICANT CHANGE UP (ref 27–34)
MCHC RBC-ENTMCNC: 33.3 GM/DL — SIGNIFICANT CHANGE UP (ref 32–36)
MCV RBC AUTO: 88.4 FL — SIGNIFICANT CHANGE UP (ref 80–100)
MONOCYTES # BLD AUTO: 0.63 K/UL — SIGNIFICANT CHANGE UP (ref 0–0.9)
MONOCYTES NFR BLD AUTO: 6.5 % — SIGNIFICANT CHANGE UP (ref 2–14)
NEUTROPHILS # BLD AUTO: 7.07 K/UL — SIGNIFICANT CHANGE UP (ref 1.8–7.4)
NEUTROPHILS NFR BLD AUTO: 73.3 % — SIGNIFICANT CHANGE UP (ref 43–77)
PLATELET # BLD AUTO: 366 K/UL — SIGNIFICANT CHANGE UP (ref 150–400)
POTASSIUM SERPL-MCNC: 4.1 MMOL/L — SIGNIFICANT CHANGE UP (ref 3.5–5.3)
POTASSIUM SERPL-SCNC: 4.1 MMOL/L — SIGNIFICANT CHANGE UP (ref 3.5–5.3)
PROT SERPL-MCNC: 7.1 G/DL — SIGNIFICANT CHANGE UP (ref 6.6–8.7)
RBC # BLD: 4.82 M/UL — SIGNIFICANT CHANGE UP (ref 3.8–5.2)
RBC # FLD: 13.9 % — SIGNIFICANT CHANGE UP (ref 10.3–14.5)
SARS-COV-2 RNA SPEC QL NAA+PROBE: SIGNIFICANT CHANGE UP
SODIUM SERPL-SCNC: 134 MMOL/L — LOW (ref 135–145)
WBC # BLD: 9.66 K/UL — SIGNIFICANT CHANGE UP (ref 3.8–10.5)
WBC # FLD AUTO: 9.66 K/UL — SIGNIFICANT CHANGE UP (ref 3.8–10.5)

## 2020-07-31 PROCEDURE — 76818 FETAL BIOPHYS PROFILE W/NST: CPT

## 2020-07-31 PROCEDURE — 88307 TISSUE EXAM BY PATHOLOGIST: CPT | Mod: 26

## 2020-07-31 PROCEDURE — 93976 VASCULAR STUDY: CPT

## 2020-07-31 PROCEDURE — 76820 UMBILICAL ARTERY ECHO: CPT

## 2020-07-31 PROCEDURE — 76821 MIDDLE CEREBRAL ARTERY ECHO: CPT | Mod: 59

## 2020-07-31 RX ORDER — OXYCODONE HYDROCHLORIDE 5 MG/1
5 TABLET ORAL
Refills: 0 | Status: DISCONTINUED | OUTPATIENT
Start: 2020-07-31 | End: 2020-08-03

## 2020-07-31 RX ORDER — IBUPROFEN 200 MG
600 TABLET ORAL EVERY 6 HOURS
Refills: 0 | Status: COMPLETED | OUTPATIENT
Start: 2020-07-31 | End: 2021-06-29

## 2020-07-31 RX ORDER — METOCLOPRAMIDE HCL 10 MG
10 TABLET ORAL ONCE
Refills: 0 | Status: DISCONTINUED | OUTPATIENT
Start: 2020-07-31 | End: 2020-07-31

## 2020-07-31 RX ORDER — LANOLIN
1 OINTMENT (GRAM) TOPICAL EVERY 6 HOURS
Refills: 0 | Status: DISCONTINUED | OUTPATIENT
Start: 2020-07-31 | End: 2020-08-03

## 2020-07-31 RX ORDER — SODIUM CHLORIDE 9 MG/ML
1000 INJECTION, SOLUTION INTRAVENOUS
Refills: 0 | Status: DISCONTINUED | OUTPATIENT
Start: 2020-07-31 | End: 2020-07-31

## 2020-07-31 RX ORDER — ACETAMINOPHEN 500 MG
975 TABLET ORAL
Refills: 0 | Status: DISCONTINUED | OUTPATIENT
Start: 2020-07-31 | End: 2020-08-03

## 2020-07-31 RX ORDER — DIPHENHYDRAMINE HCL 50 MG
25 CAPSULE ORAL ONCE
Refills: 0 | Status: DISCONTINUED | OUTPATIENT
Start: 2020-07-31 | End: 2020-08-03

## 2020-07-31 RX ORDER — SIMETHICONE 80 MG/1
80 TABLET, CHEWABLE ORAL EVERY 4 HOURS
Refills: 0 | Status: DISCONTINUED | OUTPATIENT
Start: 2020-07-31 | End: 2020-08-03

## 2020-07-31 RX ORDER — OXYTOCIN 10 UNIT/ML
333.33 VIAL (ML) INJECTION
Qty: 20 | Refills: 0 | Status: DISCONTINUED | OUTPATIENT
Start: 2020-07-31 | End: 2020-08-03

## 2020-07-31 RX ORDER — KETOROLAC TROMETHAMINE 30 MG/ML
30 SYRINGE (ML) INJECTION EVERY 6 HOURS
Refills: 0 | Status: DISCONTINUED | OUTPATIENT
Start: 2020-07-31 | End: 2020-08-01

## 2020-07-31 RX ORDER — IBUPROFEN 200 MG
1 TABLET ORAL
Qty: 40 | Refills: 0
Start: 2020-07-31 | End: 2020-08-09

## 2020-07-31 RX ORDER — FAMOTIDINE 10 MG/ML
20 INJECTION INTRAVENOUS ONCE
Refills: 0 | Status: COMPLETED | OUTPATIENT
Start: 2020-07-31 | End: 2020-07-31

## 2020-07-31 RX ORDER — DEXAMETHASONE 0.5 MG/5ML
4 ELIXIR ORAL EVERY 6 HOURS
Refills: 0 | Status: DISCONTINUED | OUTPATIENT
Start: 2020-07-31 | End: 2020-08-03

## 2020-07-31 RX ORDER — TRANEXAMIC ACID 100 MG/ML
1000 INJECTION, SOLUTION INTRAVENOUS ONCE
Refills: 0 | Status: COMPLETED | OUTPATIENT
Start: 2020-07-31 | End: 2020-07-31

## 2020-07-31 RX ORDER — TETANUS TOXOID, REDUCED DIPHTHERIA TOXOID AND ACELLULAR PERTUSSIS VACCINE, ADSORBED 5; 2.5; 8; 8; 2.5 [IU]/.5ML; [IU]/.5ML; UG/.5ML; UG/.5ML; UG/.5ML
0.5 SUSPENSION INTRAMUSCULAR ONCE
Refills: 0 | Status: DISCONTINUED | OUTPATIENT
Start: 2020-07-31 | End: 2020-08-03

## 2020-07-31 RX ORDER — LEVOTHYROXINE SODIUM 125 MCG
150 TABLET ORAL DAILY
Refills: 0 | Status: DISCONTINUED | OUTPATIENT
Start: 2020-07-31 | End: 2020-08-03

## 2020-07-31 RX ORDER — LEVOTHYROXINE SODIUM 125 MCG
0 TABLET ORAL
Qty: 0 | Refills: 0 | DISCHARGE

## 2020-07-31 RX ORDER — CITRIC ACID/SODIUM CITRATE 300-500 MG
30 SOLUTION, ORAL ORAL ONCE
Refills: 0 | Status: COMPLETED | OUTPATIENT
Start: 2020-07-31 | End: 2020-07-31

## 2020-07-31 RX ORDER — SODIUM CHLORIDE 9 MG/ML
1000 INJECTION, SOLUTION INTRAVENOUS ONCE
Refills: 0 | Status: COMPLETED | OUTPATIENT
Start: 2020-07-31 | End: 2020-07-31

## 2020-07-31 RX ORDER — NALOXONE HYDROCHLORIDE 4 MG/.1ML
0.1 SPRAY NASAL
Refills: 0 | Status: DISCONTINUED | OUTPATIENT
Start: 2020-07-31 | End: 2020-08-03

## 2020-07-31 RX ORDER — LEVOTHYROXINE SODIUM 125 MCG
1 TABLET ORAL
Qty: 30 | Refills: 0
Start: 2020-07-31 | End: 2020-08-29

## 2020-07-31 RX ORDER — DIPHENHYDRAMINE HCL 50 MG
25 CAPSULE ORAL EVERY 4 HOURS
Refills: 0 | Status: DISCONTINUED | OUTPATIENT
Start: 2020-07-31 | End: 2020-08-03

## 2020-07-31 RX ORDER — MAGNESIUM HYDROXIDE 400 MG/1
30 TABLET, CHEWABLE ORAL
Refills: 0 | Status: DISCONTINUED | OUTPATIENT
Start: 2020-07-31 | End: 2020-08-03

## 2020-07-31 RX ORDER — CEFAZOLIN SODIUM 1 G
2000 VIAL (EA) INJECTION ONCE
Refills: 0 | Status: COMPLETED | OUTPATIENT
Start: 2020-07-31 | End: 2020-07-31

## 2020-07-31 RX ORDER — DIPHENHYDRAMINE HCL 50 MG
25 CAPSULE ORAL EVERY 6 HOURS
Refills: 0 | Status: DISCONTINUED | OUTPATIENT
Start: 2020-07-31 | End: 2020-08-03

## 2020-07-31 RX ORDER — OXYCODONE HYDROCHLORIDE 5 MG/1
5 TABLET ORAL ONCE
Refills: 0 | Status: DISCONTINUED | OUTPATIENT
Start: 2020-07-31 | End: 2020-08-03

## 2020-07-31 RX ORDER — ONDANSETRON 8 MG/1
4 TABLET, FILM COATED ORAL EVERY 6 HOURS
Refills: 0 | Status: DISCONTINUED | OUTPATIENT
Start: 2020-07-31 | End: 2020-08-03

## 2020-07-31 RX ORDER — SODIUM CHLORIDE 9 MG/ML
1000 INJECTION, SOLUTION INTRAVENOUS
Refills: 0 | Status: DISCONTINUED | OUTPATIENT
Start: 2020-07-31 | End: 2020-08-03

## 2020-07-31 RX ADMIN — SODIUM CHLORIDE 125 MILLILITER(S): 9 INJECTION, SOLUTION INTRAVENOUS at 20:19

## 2020-07-31 RX ADMIN — Medication 30 MILLIGRAM(S): at 19:29

## 2020-07-31 RX ADMIN — Medication 1000 MILLIUNIT(S)/MIN: at 16:43

## 2020-07-31 RX ADMIN — Medication 975 MILLIGRAM(S): at 22:10

## 2020-07-31 RX ADMIN — TRANEXAMIC ACID 220 MILLIGRAM(S): 100 INJECTION, SOLUTION INTRAVENOUS at 15:16

## 2020-07-31 RX ADMIN — Medication 30 MILLILITER(S): at 15:20

## 2020-07-31 RX ADMIN — SODIUM CHLORIDE 2000 MILLILITER(S): 9 INJECTION, SOLUTION INTRAVENOUS at 13:00

## 2020-07-31 RX ADMIN — Medication 975 MILLIGRAM(S): at 21:10

## 2020-07-31 RX ADMIN — FAMOTIDINE 20 MILLIGRAM(S): 10 INJECTION INTRAVENOUS at 15:20

## 2020-07-31 RX ADMIN — Medication 100 MILLIGRAM(S): at 16:21

## 2020-07-31 NOTE — OB PROVIDER H&P - NSHPPHYSICALEXAM_GEN_ALL_CORE
Gen: NAD  ABd: soft, gravid  SVE: deferred  Sono: at [facility] office earlier today

## 2020-07-31 NOTE — DISCHARGE NOTE OB - CARE PLAN
Principal Discharge DX:	S/P  section  Goal:	Rapid recovery  Assessment and plan of treatment:	Assessment and Plan of Treatment: Please call your provider to schedule postoperative wound check visit in 1-2 weeks. Take medications as directed, regular diet, activity as tolerated. Exclusive breast feeding for the first 6 months is recommended. Nothing per vagina for 6 weeks (incl. sex, douching, etc). If you have additional concerns, please inform your provider.

## 2020-07-31 NOTE — OB PROVIDER DELIVERY SUMMARY - NSPROVIDERDELIVERYNOTE_OBGYN_ALL_OB_FT
repeat  section for oligo at [id number]  one layer closure with one single stitch for hemostasis  minimal bleeding from bladder serosa, snow placed with good hemostasis  muscles were not able to be reapproximated  rest of closure as per usual  APGARs 7/8  NIPT abnormal- some features of down syndrome with desaturation when not on oxygen per NICU  ppalos

## 2020-07-31 NOTE — OB PROVIDER H&P - ASSESSMENT
39y  at 38.1 weeks GA by 6 weeks sono presents to L&D for a repeat CS in the setting of oligo and non-reassuring fetal status.   - Admit for repeat CS  - admission labs + CMP  - consent  - ancef 2g for prophylaxis

## 2020-07-31 NOTE — OB NEONATOLOGY/PEDIATRICIAN DELIVERY SUMMARY - NSPEDSNEONOTESA_OBGYN_ALL_OB_FT
39y  at 38.1 weeks GA by 6 weeks sono presents to L&D for a scheduled repeat C/S in the setting of oligo and non-reassuring fetal status. She had + PNC, is O pos, HBsAg neg, HIV neg, RPR neg, Rubella Imm, GBS pos, COVID19 neg  Prenatal course is significant for: PEC in prior pregnancy, Oligo, Poor fetal growth,  AMA: NIPS high risk for T21, QUAD screen high risk for T18 and T21, low estriol (0.5%),  Hypothyroidism on Levothyroxine  L&D:  AROM at delivery.  Baby born vertex with good cry, transferred to warmer, orally suctioned, dried, and examined.  Baby showed to father and then transferred to mother for STS.    A/P:  FT Female  Transition to regular Nursery under Peds Hospitalist care.  Infant needs TFT's PTD 39y  at 38.1 weeks GA by 6 weeks sono presents to L&D for a scheduled repeat C/S in the setting of oligo and non-reassuring fetal status. She had + PNC, is O pos, HBsAg neg, HIV neg, RPR neg, Rubella Imm, GBS pos, COVID19 neg  Prenatal course is significant for: PEC in prior pregnancy, Oligo, Poor fetal growth,  AMA: NIPS high risk for T21, QUAD screen high risk for T18 and T21, low estriol (0.5%),  Hypothyroidism on Levothyroxine  L&D:  AROM at delivery.  Baby born vertex with good cry, delayed cord clamping, transferred to warmer, orally suctioned, dried, and examined.  Baby showed to father and then transferred to NICU for further evaluation and management.    A/P:  FT Female  Transition to regular Nursery under Peds Hospitalist care.  Infant needs TFT's PTD 39y  at 38.1 weeks GA by 6 weeks sono presents to L&D for a scheduled repeat C/S in the setting of oligo and non-reassuring fetal status. She had + PNC, is O pos, HBsAg neg, HIV neg, RPR neg, Rubella Imm, GBS pos, COVID19 neg  Prenatal course is significant for: PEC in prior pregnancy, Oligo, Poor fetal growth,  AMA: NIPS high risk for T21, QUAD screen high risk for T18 and T21, low estriol (0.5%),  Hypothyroidism on Levothyroxine.  Normal fetal echo.   L&D:  AROM at delivery.  Baby born vertex with good cry, delayed cord clamping, transferred to warmer, orally suctioned, dried, and examined. Infant noted to have mildly decreased tone and Down's facies. Infant noted to be cyanotic despite good cry and respiratory effort. CPAP 5 started. Pulse Ox at aprox 3 min of life 68.  FIO2 adjusted to achieve target O sats.  Baby showed to father and then mother.  Infant transferred to NICU for further evaluation and management.    A/P:  FT Female, TTN, R/O Trisomy 21  Infant needs TFT's PTD

## 2020-07-31 NOTE — DISCHARGE NOTE OB - PROVIDER TOKENS
FREE:[LAST:[[facility] Office],PHONE:[phone]],FAX:[phone]   [phone]    -],ADDRESS:[[address] [place] [address]]]

## 2020-07-31 NOTE — DISCHARGE NOTE OB - CARE PROVIDER_API CALL
[facility] Office,   [address]  [address]  Phone: [phone]  Fax: (   )    -  Follow Up Time:

## 2020-07-31 NOTE — DISCHARGE NOTE OB - MEDICATION SUMMARY - MEDICATIONS TO TAKE
I will START or STAY ON the medications listed below when I get home from the hospital:    ibuprofen 600 mg oral tablet  -- 1 tab(s) by mouth every 6 hours  -- Indication: For PRN pain    Colace 100 mg oral capsule  -- 1 cap(s) by mouth 2 times a day   -- Medication should be taken with plenty of water.    -- Indication: For PRN constipation    MiraLax oral powder for reconstitution  -- 17 gram(s) by mouth once a day   -- Dilute this medication with liquid before administration.  It is very important that you take or use this exactly as directed.  Do not skip doses or discontinue unless directed by your doctor.    -- Indication: For PRN constipation    Synthroid 150 mcg (0.15 mg) oral tablet  -- 1 tab(s) by mouth once a day  -- Indication: For continue home medication

## 2020-07-31 NOTE — DISCHARGE NOTE OB - PLAN OF CARE
Rapid recovery Assessment and Plan of Treatment: Please call your provider to schedule postoperative wound check visit in 1-2 weeks. Take medications as directed, regular diet, activity as tolerated. Exclusive breast feeding for the first 6 months is recommended. Nothing per vagina for 6 weeks (incl. sex, douching, etc). If you have additional concerns, please inform your provider.

## 2020-07-31 NOTE — OB RN DELIVERY SUMMARY - NS_SEPSISRSKCALC_OBGYN_ALL_OB_FT
EOS calculated successfully. EOS Risk Factor: 0.5/1000 live births ([facility] national incidence); GA=38w1d; Temp=98.6; ROM=0; GBS='Positive'; Antibiotics='No antibiotics or any antibiotics < 2 hrs prior to birth'

## 2020-07-31 NOTE — OB PROVIDER H&P - ATTENDING COMMENTS
AMA multip with IUP @ 38w1d admitted for repeat  section at 38w1d due to oligohydramnios. GBS pos. Maternal/fetal status reassuring    -admit  -NPO  -TXA  -consents explained and signed  all questions and concerns answered  ppalos

## 2020-07-31 NOTE — DISCHARGE NOTE OB - PATIENT PORTAL LINK FT
You can access the FollowMyHealth Patient Portal offered by [facility] by registering at the following website: [email or web address]. By joining [employer]’s FollowMyHealth portal, you will also be able to view your health information using other applications (apps) compatible with our system.

## 2020-07-31 NOTE — OB PROVIDER H&P - HISTORY OF PRESENT ILLNESS
39y  at 38.1 weeks GA by 6 weeks sono presents to L&D for a repeat CS in the setting of oligo and non-reassuring fetal status.   FELICIA: 20  LMP: 19  Prenatal course is significant for:  1. PEC in prior pregnancy   2. Oligo: ELENA 4.82 w/ a 2x2 pocket on   3. Poor fetal growth: 2645g, at th 14th%ile on   4. Abnormal dopplers: CPR low at <2.5%, left uterine artery doppler PI elevated >95th%ile w/ absent notching on    5. Prior CS x2  6. AMA: NIPS high risk for T21, QUAD screen high risk for T18 and T21, low estriol (0.5%)  7. Hypothyroidism     Patient denies vaginal bleeding, contractions and leakage of fluid. She endorses good fetal movement.     OBHx:  G1 , SAB w/ D&C @12weeks  G2 , FT pCS for failed induction, complicated by PEC, [id number]  G3 , FT rCS, uncomplicated, 9lbs  GYNHx: -fibroids, -ovarian cysts, denies STD hx, denies abnormal PAPs   PMH: hypothyroidism   PSH: CS x2 (, ), D&C ()  SH: Denies EtOH, tobacco and illicit drug use during this pregnancy; feels safe at home   Allergies: NKDA  Meds: PNVs, bASA, synthroid 150mcg qd  BMI: 28.71  Sono: vertex, posterior fundal placenta ()  EFW: 2645g, at th 14th%ile on     GBS: Pos  HIV: NR  RPR:  HepB:  Rubella: Immune  ABO: O+

## 2020-08-01 LAB
BASOPHILS # BLD AUTO: 0.02 K/UL — SIGNIFICANT CHANGE UP (ref 0–0.2)
BASOPHILS NFR BLD AUTO: 0.1 % — SIGNIFICANT CHANGE UP (ref 0–2)
EOSINOPHIL # BLD AUTO: 0.02 K/UL — SIGNIFICANT CHANGE UP (ref 0–0.5)
EOSINOPHIL NFR BLD AUTO: 0.1 % — SIGNIFICANT CHANGE UP (ref 0–6)
HCT VFR BLD CALC: 35.1 % — SIGNIFICANT CHANGE UP (ref 34.5–45)
HGB BLD-MCNC: 11.9 G/DL — SIGNIFICANT CHANGE UP (ref 11.5–15.5)
IMM GRANULOCYTES NFR BLD AUTO: 0.8 % — SIGNIFICANT CHANGE UP (ref 0–1.5)
LYMPHOCYTES # BLD AUTO: 1.87 K/UL — SIGNIFICANT CHANGE UP (ref 1–3.3)
LYMPHOCYTES # BLD AUTO: 13.3 % — SIGNIFICANT CHANGE UP (ref 13–44)
MCHC RBC-ENTMCNC: 30.1 PG — SIGNIFICANT CHANGE UP (ref 27–34)
MCHC RBC-ENTMCNC: 33.9 GM/DL — SIGNIFICANT CHANGE UP (ref 32–36)
MCV RBC AUTO: 88.9 FL — SIGNIFICANT CHANGE UP (ref 80–100)
MONOCYTES # BLD AUTO: 0.97 K/UL — HIGH (ref 0–0.9)
MONOCYTES NFR BLD AUTO: 6.9 % — SIGNIFICANT CHANGE UP (ref 2–14)
NEUTROPHILS # BLD AUTO: 11.05 K/UL — HIGH (ref 1.8–7.4)
NEUTROPHILS NFR BLD AUTO: 78.8 % — HIGH (ref 43–77)
PLATELET # BLD AUTO: 297 K/UL — SIGNIFICANT CHANGE UP (ref 150–400)
RBC # BLD: 3.95 M/UL — SIGNIFICANT CHANGE UP (ref 3.8–5.2)
RBC # FLD: 13.7 % — SIGNIFICANT CHANGE UP (ref 10.3–14.5)
T PALLIDUM AB TITR SER: NEGATIVE — SIGNIFICANT CHANGE UP
WBC # BLD: 14.04 K/UL — HIGH (ref 3.8–10.5)
WBC # FLD AUTO: 14.04 K/UL — HIGH (ref 3.8–10.5)

## 2020-08-01 RX ORDER — IBUPROFEN 200 MG
600 TABLET ORAL EVERY 6 HOURS
Refills: 0 | Status: DISCONTINUED | OUTPATIENT
Start: 2020-08-01 | End: 2020-08-03

## 2020-08-01 RX ADMIN — Medication 600 MILLIGRAM(S): at 23:35

## 2020-08-01 RX ADMIN — Medication 600 MILLIGRAM(S): at 18:52

## 2020-08-01 RX ADMIN — Medication 600 MILLIGRAM(S): at 18:12

## 2020-08-01 RX ADMIN — Medication 30 MILLIGRAM(S): at 05:27

## 2020-08-01 RX ADMIN — Medication 1 TABLET(S): at 12:12

## 2020-08-01 RX ADMIN — Medication 975 MILLIGRAM(S): at 16:00

## 2020-08-01 RX ADMIN — Medication 975 MILLIGRAM(S): at 15:37

## 2020-08-01 RX ADMIN — Medication 975 MILLIGRAM(S): at 02:03

## 2020-08-01 RX ADMIN — Medication 975 MILLIGRAM(S): at 09:52

## 2020-08-01 RX ADMIN — Medication 30 MILLIGRAM(S): at 00:20

## 2020-08-01 RX ADMIN — Medication 150 MICROGRAM(S): at 05:12

## 2020-08-01 RX ADMIN — Medication 30 MILLIGRAM(S): at 12:12

## 2020-08-01 RX ADMIN — Medication 30 MILLIGRAM(S): at 12:27

## 2020-08-01 RX ADMIN — Medication 975 MILLIGRAM(S): at 09:00

## 2020-08-01 RX ADMIN — Medication 30 MILLIGRAM(S): at 00:35

## 2020-08-01 RX ADMIN — Medication 30 MILLIGRAM(S): at 05:12

## 2020-08-01 NOTE — PROGRESS NOTE ADULT - SUBJECTIVE AND OBJECTIVE BOX
Delivery Post Partum Progress Note    JO BAEZA is a 39y  s/p uncomplicated rCS POD #1, female infant    Patient was seen and examined at bedside.     SUBJECTIVE:  No acute events overnight  Reports feeling well this morning  Pain well controlled with PRN pain medication  Tolerating PO without N/V.  +flatus. No BM.   Voiding spontaneously  Ambulating without assistance  Reports minimal lochia   Denies fevers, chills, shortness of breath, headaches, chest pain, vision changes or calf pain.      OBJECTIVE:  Physical exam:  General: AOx3, NAD.  Heart: RRR. S1S2.  Lungs: CTABL. Good airflow bilaterally.   Abdomen: +BS, Soft, appropriately tender, nondistended, no guarding or rebound tenderness, firm uterine fundus at umbilicus  Incision: clean dry and intact with steri strips in place. No erythema or discharge.  Ext: No DVT signs, warm extremities.    Vital Signs Last 24 Hrs  T(C): 37 (01 Aug 2020 04:15), Max: 37.1 (2020 20:45)  T(F): 98.6 (01 Aug 2020 04:15), Max: 98.8 (2020 20:45)  HR: 84 (01 Aug 2020 04:15) (70 - 93)  BP: 105/65 (01 Aug 2020 04:15) (105/65 - 136/89)  BP(mean): 97 (2020 17:49) (92 - 97)  RR: 18 (01 Aug 2020 04:15) (13 - 19)  SpO2: 96% (01 Aug 2020 04:15) (96% - 100%)      20 @ 07:01  -  20 @ 05:57  --------------------------------------------------------  IN: 2850 mL / OUT: 2325 mL / NET: 525 mL        LABS:                        14.2   9.66  )-----------( 366      ( 2020 13:38 )             42.6       COVID-19 PCR: NotDetec (20 @ 13:41)  Antibody Screen: NEG (07-31-20 @ 13:26)      ASSESSMENT:  JO BAEZA is a 39y  s/p uncomplicated rCS POD #1, female infant. Patient has no complaints at this time. Incision healing well. Post-op labs reviewed and WNL. VSS.     #Postpartum   - Continue routine post-operative  and post-partum care  - Regular diet, advance as tolerated  - Continue with current pain management  - Encourage maternal- bonding  - Encourage ambulation. Continue with SCDs and prophylactic Lovenox for DVT ppx    Discussed with Dr Duffy

## 2020-08-02 DIAGNOSIS — Z98.891 HISTORY OF UTERINE SCAR FROM PREVIOUS SURGERY: ICD-10-CM

## 2020-08-02 RX ADMIN — Medication 975 MILLIGRAM(S): at 03:26

## 2020-08-02 RX ADMIN — Medication 975 MILLIGRAM(S): at 21:00

## 2020-08-02 RX ADMIN — Medication 1 TABLET(S): at 12:16

## 2020-08-02 RX ADMIN — Medication 975 MILLIGRAM(S): at 09:35

## 2020-08-02 RX ADMIN — Medication 600 MILLIGRAM(S): at 06:10

## 2020-08-02 RX ADMIN — Medication 600 MILLIGRAM(S): at 12:15

## 2020-08-02 RX ADMIN — Medication 600 MILLIGRAM(S): at 01:16

## 2020-08-02 RX ADMIN — Medication 600 MILLIGRAM(S): at 23:00

## 2020-08-02 RX ADMIN — MAGNESIUM HYDROXIDE 30 MILLILITER(S): 400 TABLET, CHEWABLE ORAL at 05:11

## 2020-08-02 RX ADMIN — Medication 975 MILLIGRAM(S): at 09:05

## 2020-08-02 RX ADMIN — Medication 600 MILLIGRAM(S): at 05:10

## 2020-08-02 RX ADMIN — Medication 600 MILLIGRAM(S): at 13:00

## 2020-08-02 RX ADMIN — Medication 975 MILLIGRAM(S): at 15:21

## 2020-08-02 RX ADMIN — Medication 975 MILLIGRAM(S): at 02:26

## 2020-08-02 RX ADMIN — Medication 600 MILLIGRAM(S): at 18:12

## 2020-08-02 RX ADMIN — Medication 975 MILLIGRAM(S): at 16:00

## 2020-08-02 RX ADMIN — Medication 150 MICROGRAM(S): at 05:10

## 2020-08-02 RX ADMIN — Medication 600 MILLIGRAM(S): at 19:00

## 2020-08-02 RX ADMIN — Medication 975 MILLIGRAM(S): at 20:00

## 2020-08-02 NOTE — PROGRESS NOTE ADULT - SUBJECTIVE AND OBJECTIVE BOX
JO BAEZA is a 39y  now POD#2 s/p repeat  section at 38 weeks 2/2 to oligohydramnios. Baby female. COVID  -    S:    She is doing well, has no complaints. Pain controlled with medication   She is ambulating without difficulty and tolerating PO.   + flatus/-BM     O:    T(C): 36.7 (20 @ 04:41), Max: 37 (20 @ 08:18)  HR: 88 (20 @ 04:41) (80 - 88)  BP: 122/83 (20 @ 04:41) (101/67 - 122/83)  RR: 18 (20 @ 04:41) (18 - 18)  SpO2: 97% (20 @ 04:41) (95% - 98%)    Abdomen:  soft, non-tender, non-distended, +bowel sounds.  Uterus:  Fundus firm below umbilicus  Incision:  Clean/dry/intact  VE:  +lochia  Ext:  Non-tender, no edema                           11.9   14.04 )-----------( 297      ( 01 Aug 2020 07:06 )             35.1         134<L>  |  99  |  10.0  ----------------------------<  78  4.1   |  21.0<L>  |  0.47<L>    Ca    9.3      2020 13:40    TPro  7.1  /  Alb  3.9  /  TBili  <0.2<L>  /  DBili  x   /  AST  18  /  ALT  12  /  AlkPhos  126<H>   JO BAEZA is a 39y  now POD#2 s/p repeat  section at 38 weeks 2/2 to oligohydramnios. Baby female. COVID  -    S:    She is doing well, has no complaints. Pain controlled with medication   She is ambulating without difficulty and tolerating PO.   + flatus/-BM     O:    T(C): 36.7 (20 @ 04:41), Max: 37 (20 @ 08:18)  HR: 88 (20 @ 04:41) (80 - 88)  BP: 122/83 (20 @ 04:41) (101/67 - 122/83)  RR: 18 (20 @ 04:41) (18 - 18)  SpO2: 97% (20 @ 04:41) (95% - 98%)    Abdomen:  soft, non-tender, non-distended, +bowel sounds.  Breasts: not engorged  Uterus:  Fundus firm below umbilicus  Incision:  Clean/dry/intact  VE:  +lochia  Ext:  Non-tender, no edema                           11.9   14.04 )-----------( 297      ( 01 Aug 2020 07:06 )             35.1         134<L>  |  99  |  10.0  ----------------------------<  78  4.1   |  21.0<L>  |  0.47<L>    Ca    9.3      2020 13:40    TPro  7.1  /  Alb  3.9  /  TBili  <0.2<L>  /  DBili  x   /  AST  18  /  ALT  12  /  AlkPhos  126<H>

## 2020-08-02 NOTE — PROGRESS NOTE ADULT - ASSESSMENT
A/P: JO BAEZA is a 39y  now POD#2 s/p repeat  section at 38 weeks 2/2 to oligohydramnios. Baby female. COVID  -    # section   -Recovering well   -Voiding, tolerating PO, bowel function nml   -Advance care as tolerated   -Continue routine postpartum/postoperative care and education.  -Will discuss discharge planning with attending     #Preventive Measure   SCDs when not ambulating   Lovenox daily

## 2020-08-03 VITALS
TEMPERATURE: 98 F | HEART RATE: 77 BPM | DIASTOLIC BLOOD PRESSURE: 84 MMHG | SYSTOLIC BLOOD PRESSURE: 124 MMHG | RESPIRATION RATE: 18 BRPM

## 2020-08-03 PROCEDURE — 86901 BLOOD TYPING SEROLOGIC RH(D): CPT

## 2020-08-03 PROCEDURE — 88307 TISSUE EXAM BY PATHOLOGIST: CPT

## 2020-08-03 PROCEDURE — 87635 SARS-COV-2 COVID-19 AMP PRB: CPT

## 2020-08-03 PROCEDURE — 86900 BLOOD TYPING SEROLOGIC ABO: CPT

## 2020-08-03 PROCEDURE — 86780 TREPONEMA PALLIDUM: CPT

## 2020-08-03 PROCEDURE — 36415 COLL VENOUS BLD VENIPUNCTURE: CPT

## 2020-08-03 PROCEDURE — 80053 COMPREHEN METABOLIC PANEL: CPT

## 2020-08-03 PROCEDURE — 86850 RBC ANTIBODY SCREEN: CPT

## 2020-08-03 PROCEDURE — 86923 COMPATIBILITY TEST ELECTRIC: CPT

## 2020-08-03 PROCEDURE — 59025 FETAL NON-STRESS TEST: CPT

## 2020-08-03 PROCEDURE — 59050 FETAL MONITOR W/REPORT: CPT

## 2020-08-03 PROCEDURE — 85027 COMPLETE CBC AUTOMATED: CPT

## 2020-08-03 PROCEDURE — G0463: CPT

## 2020-08-03 RX ADMIN — Medication 600 MILLIGRAM(S): at 12:30

## 2020-08-03 RX ADMIN — Medication 600 MILLIGRAM(S): at 11:50

## 2020-08-03 RX ADMIN — Medication 1 TABLET(S): at 11:50

## 2020-08-03 RX ADMIN — Medication 150 MICROGRAM(S): at 06:12

## 2020-08-03 RX ADMIN — Medication 600 MILLIGRAM(S): at 00:00

## 2020-08-03 RX ADMIN — Medication 975 MILLIGRAM(S): at 04:44

## 2020-08-03 RX ADMIN — Medication 975 MILLIGRAM(S): at 03:44

## 2020-08-03 RX ADMIN — MAGNESIUM HYDROXIDE 30 MILLILITER(S): 400 TABLET, CHEWABLE ORAL at 06:12

## 2020-08-03 NOTE — PROGRESS NOTE ADULT - SUBJECTIVE AND OBJECTIVE BOX
Name: JO BAEZA  MRN: [id number]  Date Admitted: 20  Location: [facility] 2E2001 ([facility] 2EST)  Attending: Chely Deleon      Post Partum Note:     JO BAEZA is a 39y GXPXXX s/p uncomplicated repeat  section POD #3 due to oligohydramnios.    SUBJECTIVE:  No acute events overnight. Her pain is well controlled with PRN pain medication. No problems with ambulating, voiding, or PO intake. Has had flatus and BM. Denies N/V. Patient is having minimal lochia which is decreasing.    Baby is currently in the NICU.     OBJECTIVE:    Vital Signs Last 24 Hrs  T(C): 36.5 (03 Aug 2020 03:46), Max: 36.9 (02 Aug 2020 16:00)  T(F): 97.7 (03 Aug 2020 03:46), Max: 98.4 (02 Aug 2020 16:00)  HR: 82 (03 Aug 2020 03:46) (82 - 87)  BP: 126/80 (03 Aug 2020 03:46) (123/71 - 126/80)  RR: 18 (03 Aug 2020 03:46) (18 - 18)  SpO2: 99% (03 Aug 2020 03:46) (99% - 99%)    Physical exam:  General: AOx3, NAD.  Heart: RRR. S1S2.  Lungs: CTABL. Good airflow bilaterally.   Abdomen: +BS, Soft, appropriately tender, nondistended, no guarding or rebound tenderness, firm uterine fundus at umbilicus, the incision is clean dry and intact with steri-strips. No erythema or discharge.  Ext: No DVT signs, warm extremities.        LABS:                        11.9   14.04 )-----------( 297      ( 01 Aug 2020 07:06 )             35.1 Name: JO BAEZA  MRN: [id number]  Date Admitted: 20  Location: [facility] 2E2001 ([facility] 2EST)  Attending: Chely Deleon      Post Partum Note:     JO BAEZA is a 39y P3 s/p uncomplicated repeat  section POD #3 due to oligohydramnios.    SUBJECTIVE:  No acute events overnight. Her pain is well controlled with PRN pain medication. No problems with ambulating, voiding, or PO intake. Has had flatus and BM. Denies N/V. Patient is having minimal lochia which is decreasing.    Baby is currently in the NICU.     OBJECTIVE:    Vital Signs Last 24 Hrs  T(C): 36.5 (03 Aug 2020 03:46), Max: 36.9 (02 Aug 2020 16:00)  T(F): 97.7 (03 Aug 2020 03:46), Max: 98.4 (02 Aug 2020 16:00)  HR: 82 (03 Aug 2020 03:46) (82 - 87)  BP: 126/80 (03 Aug 2020 03:46) (123/71 - 126/80)  RR: 18 (03 Aug 2020 03:46) (18 - 18)  SpO2: 99% (03 Aug 2020 03:46) (99% - 99%)    Physical exam:  General: AOx3, NAD.  Heart: RRR. S1S2.  Lungs: CTABL. Good airflow bilaterally.   Abdomen: +BS, Soft, appropriately tender, nondistended, no guarding or rebound tenderness, firm uterine fundus at umbilicus, the incision is clean dry and intact with steri-strips. No erythema or discharge.  Ext: No DVT signs, warm extremities.        LABS:                        11.9   14.04 )-----------( 297      ( 01 Aug 2020 07:06 )             35.1

## 2020-08-03 NOTE — PROGRESS NOTE ADULT - ATTENDING COMMENTS
patient is POD#2 and baby in NICU  patient passing gas and ambulating  however, desires to stay one more day to be with  in NICU
POD3  no complaints  pain well controlled on PO meds  denies N/V. tolerating PO  exam   vitals stable   abd soft, uterus firm , NT, steristrip on place, wound healthy , no signs of infection  ext no edema , non tender  plan   DC home today   baby in NICU  post op care discussed, PP care discussed  f/up in clinic 2 weeks

## 2020-08-03 NOTE — PROGRESS NOTE ADULT - ASSESSMENT
JO BAEZA is a 39y  s/p uncomplicated repeat , POD #3 due to oligohydramnios.  - Pain controlled on current medications  - Tolerating po   - + flatus  - + void  - hgb  14.2   -->  11.9    - Pt with female infant   - Dispo: Home pending attending approval JO BAEZA is a 39y  s/p uncomplicated repeat , POD #3 due to oligohydramnios.  - Pain controlled on current medications  - Tolerating po   - + flatus  - + void  - hgb  14.2   -->  11.9    - Pt with female infant   - Dispo: Home pending attending approval      Addendum:   PGY4 patient seen and examined at bedside and agree with above

## 2020-08-07 LAB — SURGICAL PATHOLOGY STUDY: SIGNIFICANT CHANGE UP

## 2020-09-28 ENCOUNTER — APPOINTMENT (OUTPATIENT)
Dept: ENDOCRINOLOGY | Facility: CLINIC | Age: 40
End: 2020-09-28

## 2020-12-08 ENCOUNTER — RX RENEWAL (OUTPATIENT)
Age: 40
End: 2020-12-08

## 2021-01-14 VITALS
WEIGHT: 153 LBS | HEIGHT: 62 IN | HEART RATE: 87 BPM | OXYGEN SATURATION: 98 % | BODY MASS INDEX: 28.16 KG/M2 | SYSTOLIC BLOOD PRESSURE: 102 MMHG | DIASTOLIC BLOOD PRESSURE: 80 MMHG

## 2021-01-14 RX ORDER — VITAMIN A, VITAMIN C, VITAMIN D-3, VITAMIN E, VITAMIN B-1, VITAMIN B-2, NIACIN, VITAMIN B-6, CALCIUM, IRON, ZINC, COPPER 4000; 120; 400; 22; 1.84; 3; 20; 10; 1; 12; 200; 27; 25; 2 [IU]/1; MG/1; [IU]/1; MG/1; MG/1; MG/1; MG/1; MG/1; MG/1; UG/1; MG/1; MG/1; MG/1; MG/1
27-1 TABLET ORAL
Qty: 30 | Refills: 0 | Status: DISCONTINUED | COMMUNITY
Start: 2020-06-16 | End: 2021-01-14

## 2021-01-14 RX ORDER — ASPIRIN 81 MG/1
81 TABLET, CHEWABLE ORAL
Qty: 30 | Refills: 0 | Status: DISCONTINUED | COMMUNITY
Start: 2020-03-19 | End: 2021-01-14

## 2021-01-14 RX ORDER — PRENATAL VIT NO.126/IRON/FOLIC 28MG-0.8MG
28-0.8 TABLET ORAL
Refills: 0 | Status: DISCONTINUED | COMMUNITY
End: 2021-01-14

## 2021-01-25 ENCOUNTER — APPOINTMENT (OUTPATIENT)
Dept: ENDOCRINOLOGY | Facility: CLINIC | Age: 41
End: 2021-01-25

## 2021-02-26 ENCOUNTER — APPOINTMENT (OUTPATIENT)
Dept: DERMATOLOGY | Facility: CLINIC | Age: 41
End: 2021-02-26
Payer: COMMERCIAL

## 2021-02-26 PROCEDURE — 99214 OFFICE O/P EST MOD 30 MIN: CPT

## 2021-02-26 PROCEDURE — 99072 ADDL SUPL MATRL&STAF TM PHE: CPT

## 2021-03-01 ENCOUNTER — RX RENEWAL (OUTPATIENT)
Age: 41
End: 2021-03-01

## 2021-04-05 ENCOUNTER — APPOINTMENT (OUTPATIENT)
Dept: ENDOCRINOLOGY | Facility: CLINIC | Age: 41
End: 2021-04-05
Payer: COMMERCIAL

## 2021-04-05 VITALS
WEIGHT: 155 LBS | HEART RATE: 93 BPM | BODY MASS INDEX: 28.52 KG/M2 | HEIGHT: 62 IN | SYSTOLIC BLOOD PRESSURE: 110 MMHG | DIASTOLIC BLOOD PRESSURE: 78 MMHG | OXYGEN SATURATION: 99 %

## 2021-04-05 PROCEDURE — 99213 OFFICE O/P EST LOW 20 MIN: CPT | Mod: 25

## 2021-04-05 PROCEDURE — 36415 COLL VENOUS BLD VENIPUNCTURE: CPT

## 2021-04-05 PROCEDURE — 99072 ADDL SUPL MATRL&STAF TM PHE: CPT

## 2021-06-18 ENCOUNTER — NON-APPOINTMENT (OUTPATIENT)
Age: 41
End: 2021-06-18

## 2021-07-26 ENCOUNTER — APPOINTMENT (OUTPATIENT)
Dept: ENDOCRINOLOGY | Facility: CLINIC | Age: 41
End: 2021-07-26
Payer: COMMERCIAL

## 2021-07-26 VITALS
TEMPERATURE: 98 F | WEIGHT: 150 LBS | OXYGEN SATURATION: 98 % | RESPIRATION RATE: 16 BRPM | BODY MASS INDEX: 27.6 KG/M2 | DIASTOLIC BLOOD PRESSURE: 80 MMHG | HEART RATE: 94 BPM | SYSTOLIC BLOOD PRESSURE: 110 MMHG | HEIGHT: 62 IN

## 2021-07-26 PROCEDURE — 99213 OFFICE O/P EST LOW 20 MIN: CPT

## 2021-07-26 PROCEDURE — 99072 ADDL SUPL MATRL&STAF TM PHE: CPT

## 2021-07-27 ENCOUNTER — APPOINTMENT (OUTPATIENT)
Dept: DERMATOLOGY | Facility: CLINIC | Age: 41
End: 2021-07-27
Payer: COMMERCIAL

## 2021-07-27 PROCEDURE — 99214 OFFICE O/P EST MOD 30 MIN: CPT

## 2021-07-27 PROCEDURE — 99072 ADDL SUPL MATRL&STAF TM PHE: CPT

## 2021-10-29 ENCOUNTER — APPOINTMENT (OUTPATIENT)
Dept: ENDOCRINOLOGY | Facility: CLINIC | Age: 41
End: 2021-10-29

## 2022-05-27 ENCOUNTER — APPOINTMENT (OUTPATIENT)
Dept: ENDOCRINOLOGY | Facility: CLINIC | Age: 42
End: 2022-05-27
Payer: COMMERCIAL

## 2022-05-27 VITALS
TEMPERATURE: 97.9 F | OXYGEN SATURATION: 99 % | SYSTOLIC BLOOD PRESSURE: 110 MMHG | RESPIRATION RATE: 15 BRPM | WEIGHT: 154 LBS | DIASTOLIC BLOOD PRESSURE: 75 MMHG | HEIGHT: 62 IN | HEART RATE: 87 BPM | BODY MASS INDEX: 28.34 KG/M2

## 2022-05-27 PROCEDURE — 99214 OFFICE O/P EST MOD 30 MIN: CPT

## 2022-06-06 ENCOUNTER — NON-APPOINTMENT (OUTPATIENT)
Age: 42
End: 2022-06-06

## 2022-06-14 ENCOUNTER — APPOINTMENT (OUTPATIENT)
Dept: DERMATOLOGY | Facility: CLINIC | Age: 42
End: 2022-06-14
Payer: COMMERCIAL

## 2022-06-14 PROCEDURE — 99214 OFFICE O/P EST MOD 30 MIN: CPT

## 2022-07-29 ENCOUNTER — EMERGENCY (EMERGENCY)
Facility: HOSPITAL | Age: 42
LOS: 1 days | Discharge: HOME | End: 2022-07-29
Attending: EMERGENCY MEDICINE
Payer: COMMERCIAL

## 2022-07-29 VITALS
RESPIRATION RATE: 20 BRPM | DIASTOLIC BLOOD PRESSURE: 95 MMHG | OXYGEN SATURATION: 99 % | WEIGHT: 164.91 LBS | HEIGHT: 63 IN | HEART RATE: 84 BPM | TEMPERATURE: 98 F | SYSTOLIC BLOOD PRESSURE: 143 MMHG

## 2022-07-29 VITALS
SYSTOLIC BLOOD PRESSURE: 112 MMHG | TEMPERATURE: 98 F | DIASTOLIC BLOOD PRESSURE: 70 MMHG | OXYGEN SATURATION: 100 % | RESPIRATION RATE: 18 BRPM | HEART RATE: 74 BPM

## 2022-07-29 LAB
ALBUMIN SERPL ELPH-MCNC: 4.3 G/DL — SIGNIFICANT CHANGE UP (ref 3.3–5.2)
ALP SERPL-CCNC: 76 U/L — SIGNIFICANT CHANGE UP (ref 40–120)
ALT FLD-CCNC: 23 U/L — SIGNIFICANT CHANGE UP
ANION GAP SERPL CALC-SCNC: 10 MMOL/L — SIGNIFICANT CHANGE UP (ref 5–17)
APPEARANCE UR: CLEAR — SIGNIFICANT CHANGE UP
AST SERPL-CCNC: 16 U/L — SIGNIFICANT CHANGE UP
BACTERIA # UR AUTO: ABNORMAL
BASOPHILS # BLD AUTO: 0.02 K/UL — SIGNIFICANT CHANGE UP (ref 0–0.2)
BASOPHILS NFR BLD AUTO: 0.3 % — SIGNIFICANT CHANGE UP (ref 0–2)
BILIRUB SERPL-MCNC: <0.2 MG/DL — LOW (ref 0.4–2)
BILIRUB UR-MCNC: NEGATIVE — SIGNIFICANT CHANGE UP
BUN SERPL-MCNC: 12.5 MG/DL — SIGNIFICANT CHANGE UP (ref 8–20)
CALCIUM SERPL-MCNC: 8.8 MG/DL — SIGNIFICANT CHANGE UP (ref 8.4–10.5)
CHLORIDE SERPL-SCNC: 100 MMOL/L — SIGNIFICANT CHANGE UP (ref 98–107)
CO2 SERPL-SCNC: 26 MMOL/L — SIGNIFICANT CHANGE UP (ref 22–29)
COLOR SPEC: YELLOW — SIGNIFICANT CHANGE UP
CREAT SERPL-MCNC: 0.55 MG/DL — SIGNIFICANT CHANGE UP (ref 0.5–1.3)
DIFF PNL FLD: ABNORMAL
EGFR: 120 ML/MIN/1.73M2 — SIGNIFICANT CHANGE UP
EOSINOPHIL # BLD AUTO: 0.14 K/UL — SIGNIFICANT CHANGE UP (ref 0–0.5)
EOSINOPHIL NFR BLD AUTO: 2 % — SIGNIFICANT CHANGE UP (ref 0–6)
EPI CELLS # UR: ABNORMAL
GLUCOSE SERPL-MCNC: 107 MG/DL — HIGH (ref 70–99)
GLUCOSE UR QL: NEGATIVE — SIGNIFICANT CHANGE UP
HCG UR QL: NEGATIVE — SIGNIFICANT CHANGE UP
HCT VFR BLD CALC: 39.5 % — SIGNIFICANT CHANGE UP (ref 34.5–45)
HGB BLD-MCNC: 13.4 G/DL — SIGNIFICANT CHANGE UP (ref 11.5–15.5)
IMM GRANULOCYTES NFR BLD AUTO: 0.3 % — SIGNIFICANT CHANGE UP (ref 0–1.5)
KETONES UR-MCNC: NEGATIVE — SIGNIFICANT CHANGE UP
LEUKOCYTE ESTERASE UR-ACNC: ABNORMAL
LYMPHOCYTES # BLD AUTO: 2.3 K/UL — SIGNIFICANT CHANGE UP (ref 1–3.3)
LYMPHOCYTES # BLD AUTO: 32.5 % — SIGNIFICANT CHANGE UP (ref 13–44)
MCHC RBC-ENTMCNC: 28.8 PG — SIGNIFICANT CHANGE UP (ref 27–34)
MCHC RBC-ENTMCNC: 33.9 GM/DL — SIGNIFICANT CHANGE UP (ref 32–36)
MCV RBC AUTO: 84.8 FL — SIGNIFICANT CHANGE UP (ref 80–100)
MONOCYTES # BLD AUTO: 0.65 K/UL — SIGNIFICANT CHANGE UP (ref 0–0.9)
MONOCYTES NFR BLD AUTO: 9.2 % — SIGNIFICANT CHANGE UP (ref 2–14)
NEUTROPHILS # BLD AUTO: 3.94 K/UL — SIGNIFICANT CHANGE UP (ref 1.8–7.4)
NEUTROPHILS NFR BLD AUTO: 55.7 % — SIGNIFICANT CHANGE UP (ref 43–77)
NITRITE UR-MCNC: POSITIVE
PH UR: 6 — SIGNIFICANT CHANGE UP (ref 5–8)
PLATELET # BLD AUTO: 357 K/UL — SIGNIFICANT CHANGE UP (ref 150–400)
POTASSIUM SERPL-MCNC: 3.6 MMOL/L — SIGNIFICANT CHANGE UP (ref 3.5–5.3)
POTASSIUM SERPL-SCNC: 3.6 MMOL/L — SIGNIFICANT CHANGE UP (ref 3.5–5.3)
PROT SERPL-MCNC: 6.9 G/DL — SIGNIFICANT CHANGE UP (ref 6.6–8.7)
PROT UR-MCNC: NEGATIVE — SIGNIFICANT CHANGE UP
RBC # BLD: 4.66 M/UL — SIGNIFICANT CHANGE UP (ref 3.8–5.2)
RBC # FLD: 12.2 % — SIGNIFICANT CHANGE UP (ref 10.3–14.5)
RBC CASTS # UR COMP ASSIST: SIGNIFICANT CHANGE UP /HPF (ref 0–4)
SODIUM SERPL-SCNC: 136 MMOL/L — SIGNIFICANT CHANGE UP (ref 135–145)
SP GR SPEC: 1.01 — SIGNIFICANT CHANGE UP (ref 1.01–1.02)
UROBILINOGEN FLD QL: NEGATIVE — SIGNIFICANT CHANGE UP
WBC # BLD: 7.07 K/UL — SIGNIFICANT CHANGE UP (ref 3.8–10.5)
WBC # FLD AUTO: 7.07 K/UL — SIGNIFICANT CHANGE UP (ref 3.8–10.5)
WBC UR QL: >50 /HPF (ref 0–5)

## 2022-07-29 PROCEDURE — 96375 TX/PRO/DX INJ NEW DRUG ADDON: CPT

## 2022-07-29 PROCEDURE — 74177 CT ABD & PELVIS W/CONTRAST: CPT | Mod: MD

## 2022-07-29 PROCEDURE — 87186 SC STD MICRODIL/AGAR DIL: CPT

## 2022-07-29 PROCEDURE — 81001 URINALYSIS AUTO W/SCOPE: CPT

## 2022-07-29 PROCEDURE — 99284 EMERGENCY DEPT VISIT MOD MDM: CPT | Mod: 25

## 2022-07-29 PROCEDURE — 85025 COMPLETE CBC W/AUTO DIFF WBC: CPT

## 2022-07-29 PROCEDURE — 96365 THER/PROPH/DIAG IV INF INIT: CPT | Mod: XU

## 2022-07-29 PROCEDURE — 99285 EMERGENCY DEPT VISIT HI MDM: CPT

## 2022-07-29 PROCEDURE — 81025 URINE PREGNANCY TEST: CPT

## 2022-07-29 PROCEDURE — 87086 URINE CULTURE/COLONY COUNT: CPT

## 2022-07-29 PROCEDURE — 36415 COLL VENOUS BLD VENIPUNCTURE: CPT

## 2022-07-29 PROCEDURE — 74177 CT ABD & PELVIS W/CONTRAST: CPT | Mod: 26,MD

## 2022-07-29 PROCEDURE — 80053 COMPREHEN METABOLIC PANEL: CPT

## 2022-07-29 RX ORDER — ACETAMINOPHEN 500 MG
1000 TABLET ORAL ONCE
Refills: 0 | Status: COMPLETED | OUTPATIENT
Start: 2022-07-29 | End: 2022-07-29

## 2022-07-29 RX ORDER — CEFTRIAXONE 500 MG/1
1000 INJECTION, POWDER, FOR SOLUTION INTRAMUSCULAR; INTRAVENOUS ONCE
Refills: 0 | Status: COMPLETED | OUTPATIENT
Start: 2022-07-29 | End: 2022-07-29

## 2022-07-29 RX ORDER — SODIUM CHLORIDE 9 MG/ML
1000 INJECTION INTRAMUSCULAR; INTRAVENOUS; SUBCUTANEOUS ONCE
Refills: 0 | Status: COMPLETED | OUTPATIENT
Start: 2022-07-29 | End: 2022-07-29

## 2022-07-29 RX ORDER — CEFPODOXIME PROXETIL 100 MG
1 TABLET ORAL
Qty: 20 | Refills: 0
Start: 2022-07-29 | End: 2022-08-07

## 2022-07-29 RX ADMIN — Medication 1000 MILLIGRAM(S): at 03:30

## 2022-07-29 RX ADMIN — SODIUM CHLORIDE 1000 MILLILITER(S): 9 INJECTION INTRAMUSCULAR; INTRAVENOUS; SUBCUTANEOUS at 02:35

## 2022-07-29 RX ADMIN — Medication 400 MILLIGRAM(S): at 02:35

## 2022-07-29 RX ADMIN — SODIUM CHLORIDE 1000 MILLILITER(S): 9 INJECTION INTRAMUSCULAR; INTRAVENOUS; SUBCUTANEOUS at 03:30

## 2022-07-29 RX ADMIN — CEFTRIAXONE 100 MILLIGRAM(S): 500 INJECTION, POWDER, FOR SOLUTION INTRAMUSCULAR; INTRAVENOUS at 03:53

## 2022-07-29 NOTE — ED ADULT NURSE NOTE - TEMPLATE
[FreeTextEntry1] : HELDER THOMPSON is a 84 year old female here for a physical exam and f/u of above noted medical issues. \par  Back Pain

## 2022-07-29 NOTE — ED PROVIDER NOTE - PHYSICAL EXAMINATION
Gen: Well appearing in NAD  Head: NC/AT  Neck: trachea midline  Resp:  No distress  ABD soft nd + LLQ tenderness + right CVAT . no rebound or guarding.   Ext: no deformities  Neuro:  A&O appears non focal  Skin:  Warm and dry as visualized  Psych:  Normal affect and mood

## 2022-07-29 NOTE — ED PROVIDER NOTE - NS ED ATTENDING STATEMENT MOD
This was a shared visit with the CRISTIANE. I reviewed and verified the documentation and independently performed the documented:

## 2022-07-29 NOTE — ED ADULT NURSE NOTE - OBJECTIVE STATEMENT
Assumed care of pt at 0230 in . Pt A&Ox4 c/o right sided flank pain that radiates to her lower abdomen for the past week, pt also complains of dysuria and pain while urinating, pt denies hematuria/N/V/D/fevers, pt's abdomen is tender to touch in the LLQ/soft/non-distended, family at bedside, pt shows no signs of respiratory distress, pt denies CP/SOB, pt resting comfortably

## 2022-07-29 NOTE — ED PROVIDER NOTE - NSFOLLOWUPINSTRUCTIONS_ED_ALL_ED_FT
please finish the complete course of the antibiotic  new or worsening symptoms such as increased pain, fever chills, vomiting return to the ER   please follow with primary care doctor within the next 3-5 days   WATER WATER WATER       Urinary Tract Infection    A urinary tract infection (UTI) is an infection of any part of the urinary tract, which includes the kidneys, ureters, bladder, and urethra. Risk factors include ignoring your need to urinate, wiping back to front if female, being an uncircumcised male, and having diabetes or a weak immune system. Symptoms include frequent urination, pain or burning with urination, foul smelling urine, cloudy urine, pain in the lower abdomen, blood in the urine, and fever. If you were prescribed an antibiotic medicine, take it as told by your health care provider. Do not stop taking the antibiotic even if you start to feel better.    SEEK IMMEDIATE MEDICAL CARE IF YOU HAVE ANY OF THE FOLLOWING SYMPTOMS: severe back or abdominal pain, fever, inability to keep fluids or medicine down, dizziness/lightheadedness, or a change in mental status.

## 2022-07-29 NOTE — ED PROVIDER NOTE - PROGRESS NOTE DETAILS
symptomatic improvement   pt made aware of resuts and all incidental findings. given copy of reports. instructed on follow up and strict return precautions. pt verbalizes understanding and given opportunity to ask further questions

## 2022-07-29 NOTE — ED PROVIDER NOTE - ATTENDING APP SHARED VISIT CONTRIBUTION OF CARE
Lower abdominal pain with UA and CT c/w cystitis. PO abx, cultures sent for follow up, stable for dc with return precautions.

## 2022-07-29 NOTE — ED ADULT TRIAGE NOTE - CHIEF COMPLAINT QUOTE
C/O right sided flank pain that radiates to her lower abdomen for the past week. +dysuria. Denies hematuria, N/V/D or fevers.

## 2022-07-29 NOTE — ED PROVIDER NOTE - CLINICAL SUMMARY MEDICAL DECISION MAKING FREE TEXT BOX
dysuria flank pain and LLQ abd pain. + flank tenderness and LLQ tenderness, vitals stable nontoxic appearing   pyelo vs diverticulitis

## 2022-07-29 NOTE — ED PROVIDER NOTE - PATIENT PORTAL LINK FT
You can access the FollowMyHealth Patient Portal offered by [facility] by registering at the following website: [email or web address]. By joining [employer]’s FollowMyHealth portal, you will also be able to view your health information using other applications (apps) compatible with our system.

## 2022-09-07 NOTE — ED ADULT NURSE NOTE - ATTEMPT TO OOB
Referring Provider:    No referring provider defined for this encounter.  Subjective:   Patient: Shelly Kam [id number], :1974   Visit date:2022 4:58 PM    Chief Complaint: see below  HPI:       Other (Difficulty swallowing) and Neck Swelling (C/o lymph nodes swollen both sides of neck )    Prior notes reviewed  Clinical documentation obtained by nursing staff reviewed.     Since then, she has had multiple procedures and ultimately was able to be decannulated.   She has not had any increased difficulty breathing since decannulation.  She has also undergone total thyroidectomy for Grave's, performed by Alon Barton.  She does report intermittent swelling of the saliva glands.  Non focal.  This fluctuates.  She reports increased difficulty swallowing, dyspepsia, heartburn, nausea.  She was on a PPI in the past but has not followed up with her physician who manages this in over a year.  She denies true choking episodes.  She feels mostly that there is delayed passage.     3/2016  42 y.o. year old female with a PMH significant for strep pharyngitis that was evaluated at [facility] approx 5 days ago and was treated with PO abx. Patient reportedly presented again to [facility] earlier in the day with signs/symptoms of PTA. She was unable to tolerate her secretions and audible stridor was heard. Patient was attempted to be intubated orally multiple times with desaturations into the 60s, ultimately a surgical airway was obtained 2/2 to impending airway closure. atient was transferred to [facility] for higher level of care. HPI obtained from paper chart review and from discussion with eva MAI. In ED patient sedated with Propofol gttP and 6.0 ETT was secured with cotton ties.        Objective:     Physical Exam:  Vitals:  /76   Pulse 61   Temp 98.1 °F (36.7 °C) (Temporal)   LMP 2022   General appearance:  Well developed, well nourished    Ears:  Otoscopy of external auditory canals and tympanic membranes was normal,  clinical speech reception thresholds grossly intact, no mass/lesion of auricle.    Nose:  No masses/lesions of external nose, nasal mucosa, septum, and turbinates were within normal limits.    Mouth:  No mass/lesion of lips, teeth, gums, hard/soft palate, tongue, tonsils, or oropharynx.    Neck & Lymphatics:  No cervical lymphadenopathy, no neck mass/crepitus/ asymmetry, trachea is midline, no thyroid enlargement/tenderness/mass.      Flexible Laryngoscopy  Procedure: Risks, benefits, and alternatives of the procedure were discussed with the patient, and the patient consented to the fiberoptic examination.  We applied a topical nasal decongestant and analgesic.  After adequate anesthesia was obtained, the flexible fiberoptic scope was passed into each nostril independently.  Each nasal cavity, the entire pharynx (nasopharynx to hypopharynx) and the larynx were visualized. At the end of the examination, the scope was removed. The patient tolerated the procedure well with no complications.     Findings:  -     Laryngeal mucosa is erythematous  -     Posterior commissure has moderate hypertrophy  -     Lingual tonsils have no hypertrophy  -     Adenoids have NO  hypertrophy  -     Right vocal fold: normal mobility     mass/lesion: none  -     Left vocal fold: normal mobility     mass/lesion: none  -     Other findings: none          []  Data Reviewed:    Lab Results   Component Value Date    WBC 5.67 09/07/2022    HGB 9.8 (L) 09/07/2022    HCT 33.3 (L) 09/07/2022    MCV 77 (L) 09/07/2022    EOSINOPHIL 5.6 09/07/2022                 Assessment & Plan:   Dysphagia, unspecified type  -     Fl Modified Barium Swallow Speech; Future; Expected date: 09/08/2022  -     SLP video swallow; Future; Expected date: 09/08/2022      MBS.  If normal recommend GI eval/EGD. Discussed that she needs to follow up with primary care as well.     Thank you for allowing me to participate in the care of Shelly.       Rinku Cleary MD,  FACS [facility]   [facility]  [address]  [place] [id number]  P: [phone]  F: [phone]           no

## 2022-10-28 ENCOUNTER — EMERGENCY (EMERGENCY)
Facility: HOSPITAL | Age: 42
LOS: 1 days | Discharge: HOME | End: 2022-10-28
Attending: STUDENT IN AN ORGANIZED HEALTH CARE EDUCATION/TRAINING PROGRAM
Payer: COMMERCIAL

## 2022-10-28 VITALS
HEIGHT: 63 IN | OXYGEN SATURATION: 99 % | TEMPERATURE: 98 F | SYSTOLIC BLOOD PRESSURE: 153 MMHG | HEART RATE: 76 BPM | RESPIRATION RATE: 18 BRPM | DIASTOLIC BLOOD PRESSURE: 101 MMHG

## 2022-10-28 LAB — HCG UR QL: NEGATIVE — SIGNIFICANT CHANGE UP

## 2022-10-28 PROCEDURE — 99284 EMERGENCY DEPT VISIT MOD MDM: CPT

## 2022-10-28 RX ORDER — IBUPROFEN 200 MG
600 TABLET ORAL ONCE
Refills: 0 | Status: COMPLETED | OUTPATIENT
Start: 2022-10-28 | End: 2022-10-28

## 2022-10-28 RX ORDER — METHOCARBAMOL 500 MG/1
1500 TABLET, FILM COATED ORAL ONCE
Refills: 0 | Status: COMPLETED | OUTPATIENT
Start: 2022-10-28 | End: 2022-10-28

## 2022-10-28 RX ORDER — ACETAMINOPHEN 500 MG
650 TABLET ORAL ONCE
Refills: 0 | Status: COMPLETED | OUTPATIENT
Start: 2022-10-28 | End: 2022-10-28

## 2022-10-28 RX ADMIN — Medication 650 MILLIGRAM(S): at 22:51

## 2022-10-28 NOTE — ED PROVIDER NOTE - PATIENT PORTAL LINK FT
You can access the FollowMyHealth Patient Portal offered by [facility] by registering at the following website: [email or web address]. By joining [employer]’s FollowMyHealth portal, you will also be able to view your health information using other applications (apps) compatible with our system.

## 2022-10-28 NOTE — ED PROVIDER NOTE - OBJECTIVE STATEMENT
42y female PMHx hypothyroid present with back pain x 1 week. 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 pt reports bilateral lower back pain that occasianly radiates to her buttocks. Pt reports being able to walk but pain is exacerbated with bending at waist. Pt denies fever, chills, weakness, numbness, parasthenia, IVDA, malignancy, back surgeries, incontinence.

## 2022-10-28 NOTE — ED PROVIDER NOTE - CLINICAL SUMMARY MEDICAL DECISION MAKING FREE TEXT BOX
42y female with back pain x 1 week, + bilateral lumbar paraspinal tenderness. Pain control, Urine pregnancy, re-assess

## 2022-10-28 NOTE — ED PROVIDER NOTE - PHYSICAL EXAMINATION
Gen: No acute distress, non toxic  HEENT: Mucous membranes moist, pink conjunctivae, EOMI. PERRL. Airway patent.   CV: RRR, nl s1/s2.  Resp: CTAB, normal rate and effort. No wheezes, rhonchi, or crackles.   GI: Abdomen soft, NT, ND. No rebound, no guarding  Neuro: A&O x4, MAEx4. 5/5 str ext x 4. Sensation intact, symmetric throughout. No FND's. Gait intact. negative straight leg raise.   MSK: +lumbar paraspinal +TTP bilaterally. No midline spinal ttp. No visualized or palpable deformities.  Skin: No rashes, skin intact and well perfused. Cap refill <2sec  Vascular: Radial and dorsalis pedal pulses 2+ b/l

## 2022-10-28 NOTE — ED PROVIDER NOTE - NSFOLLOWUPINSTRUCTIONS_ED_ALL_ED_FT
- Seguimiento con el proveedor de atención primaria  - Regresar por síntomas nuevos o que empeoran    Dolor de espalda    El dolor de espalda es muy común en los adultos. La causa del dolor de espalda noah vez es peligrosa y el dolor a menudo mejora con el tiempo. Es posible que no se conozca la causa de fitzpatrick dolor de espalda y puede incluir distensión de los músculos o ligamentos, degeneración de los discos de la columna vertebral o artritis. Ocasionalmente, el dolor puede irradiarse hacia la(s) pierna(s). Los medicamentos de venta itzel para reducir el dolor y la inflamación suelen ser los más útiles. Estirarse y permanecer activo con frecuencia ayuda al proceso de curación.    BUSQUE ATENCIÓN MÉDICA INMEDIATA SI TIENE ALGUNO DE LOS SIGUIENTES SÍNTOMAS: problemas de control de los intestinos o la vejiga, debilidad inusual o entumecimiento en los brazos o las piernas, náuseas o vómitos, dolor abdominal, fiebre, mareos/aturdimiento.    - Follow up with primary care provider  - Return for new or worsening symptoms    Back Pain    Back pain is very common in adults. The cause of back pain is rarely dangerous and the pain often gets better over time. The cause of your back pain may not be known and may include strain of muscles or ligaments, degeneration of the spinal disks, or arthritis. Occasionally the pain may radiate down your leg(s). Over-the-counter medicines to reduce pain and inflammation are often the most helpful. Stretching and remaining active frequently helps the healing process.     SEEK IMMEDIATE MEDICAL CARE IF YOU HAVE ANY OF THE FOLLOWING SYMPTOMS: bowel or bladder control problems, unusual weakness or numbness in your arms or legs, nausea or vomiting, abdominal pain, fever, dizziness/lightheadedness.

## 2022-10-28 NOTE — ED ADULT TRIAGE NOTE - CHIEF COMPLAINT QUOTE
Pt reports L lower back pain radiating to the L leg x1 week. Pt denies trauma or injury to the area. Pt ambulatory in triage.

## 2022-10-28 NOTE — ED PROVIDER NOTE - NS ED ROS FT
Gen: denies fever, chills, fatigue, weight loss  Skin: denies rashes, laceration, bruising  HEENT: denies visual changes, ear pain, nasal congestion, throat pain  Respiratory: denies WEBSTER, SOB, cough, wheezing  Cardiovascular: denies chest pain, palpitations  GI: denies abdominal pain, n/v/d  : denies dysuria, frequency, urgency, bowel/bladder incontinence  MSK: Admits lower back pain x 1 week. denies joint swelling/pain, neck pain  Neuro: denies headache, dizziness, weakness, numbness

## 2022-10-29 PROCEDURE — 99283 EMERGENCY DEPT VISIT LOW MDM: CPT

## 2022-10-29 PROCEDURE — 81025 URINE PREGNANCY TEST: CPT

## 2022-10-29 RX ORDER — METHOCARBAMOL 500 MG/1
2 TABLET, FILM COATED ORAL
Qty: 18 | Refills: 0
Start: 2022-10-29 | End: 2022-10-31

## 2022-10-29 RX ORDER — IBUPROFEN 200 MG
1 TABLET ORAL
Qty: 28 | Refills: 0
Start: 2022-10-29 | End: 2022-11-04

## 2022-10-29 RX ADMIN — METHOCARBAMOL 1500 MILLIGRAM(S): 500 TABLET, FILM COATED ORAL at 02:07

## 2022-10-29 RX ADMIN — Medication 600 MILLIGRAM(S): at 02:07

## 2022-12-15 NOTE — OB RN INTRAOPERATIVE NOTE - NS_DURAMORPH_OBGYN_ALL_OB_DT
Subjective:   Edwardo Nieves is a 43 y.o. male here today for   No chief complaint on file.      #COVID-19:  -Patient contracted symptoms of COVID-19 was diagnosed on 12/8/2022.  He is experiencing symptoms of fevers, chills, myalgias, congestion, cough.  He states most of the symptoms have resolved.  His last fever was 5 to 6 days ago.  He continues to have a slight cough that is dry, hacking.  Denies any chest pain, shortness of breath, difficulty breathing.  Slight increase in fatigue but is improving as well.  Denies any loss of smell or taste.    #Health maintenance:  -Due for flu shot.    Allergies   Allergen Reactions    Orange Shortness of Breath    Tobacco [Nicotiana Tabacum]          Current medicines (including changes today)  Current Outpatient Medications   Medication Sig Dispense Refill    VITAMIN D PO Take  by mouth.      polymixin-trimethoprim (POLYTRIM) 39571-1.1 UNIT/ML-% Solution Administer 1 Drop into both eyes every 4 hours. 10 mL 0    Ginseng 100 MG Cap Take  by mouth every day.      Red Yeast Rice 600 MG Cap Take  by mouth every day.      Coenzyme Q10 (COQ-10) 150 MG Cap Take 300 mg by mouth every day.      Multiple Vitamin (MULTI-VITAMIN PO) Take  by mouth.      Glucosamine-Chondroit-Vit C-Mn (GLUCOSAMINE 1500 COMPLEX) Cap Take  by mouth.      vitamin E 180 mg (400 units) Cap Take 180 mg by mouth every day.      NON SPECIFIED Chrondroitin: 1200mg       No current facility-administered medications for this visit.     He  has a past medical history of Dog bite (11/1/2019).    He has no past medical history of Diabetes (HCC), Hyperlipidemia, or Hypertension.    ROS   Review of Systems   Constitutional:  Negative for chills and fever.   HENT:  Negative for hearing loss.    Eyes:  Negative for blurred vision.   Gastrointestinal:  Negative for abdominal pain, constipation, diarrhea, nausea and vomiting.        Objective:     Physical Exam:  BP (!) 92/60   Pulse 86   Temp 36.1 °C (96.9 °F)  "(Temporal)   Resp 14   Ht 1.905 m (6' 3\")   Wt (!) 145 kg (320 lb)   SpO2 99%  Body mass index is 40 kg/m².   Constitutional: Alert, no distress.  Skin: Warm, dry, good turgor, no rashes in visible areas.  Eye: Equal, round and reactive, conjunctiva clear, lids normal.  Neck: Trachea midline, no masses, no thyromegaly. No cervical or supraclavicular lymphadenopathy.  Respiratory: Unlabored respiratory effort, lungs clear to auscultation, no wheezes, no rhonchi.  Cardiovascular: Normal S1, S2, no murmur, no edema.  Abdomen: Soft, non-tender, no masses, no hepatosplenomegaly.  Psych: Alert and oriented x3, normal affect and mood.    Assessment and Plan:     1. COVID-19  -Resolved, vitals stable, physical examination unremarkable.  Discussed routine course of illness, continue over-the-counter cough syrup as needed.  Discussed strict return precautions, patient will follow-up as needed.    2. Need for vaccination  -Patient was agreeable to receiving the influenza vaccine in clinic today after discussion of potential risks, benefits, and side effects. Vaccine was administered without adverse effects.  - INFLUENZA VACCINE QUAD INJ (PF)    Followup: No follow-ups on file.         PLEASE NOTE: This dictation was created using voice recognition software. I have made every reasonable attempt to correct obvious errors, but I expect that there are errors of grammar and possibly content that I did not discover before finalizing the note.  " 31-Jul-2020 16:12

## 2023-04-05 NOTE — ED PROVIDER NOTE - OBJECTIVE STATEMENT
Pls advise - pt has a hx of PBC and fatty liver.    Pt has a lot of arthritis pain and her doctor gave her a prescription for norco and tizanidine.    Pt would like to know if it is Ok to take these meds?    Pt informed that you are not in the office this week.  Pls advise.thx   42 yo female presenting to the Er with 1 week of dysuria increased frequency of urination, right back pain and left lower abdominal pain. LMP 2 weeks ago. c-sections x 3, passing gas from below. no diarrhea no hematuria no nausea vomiting or diarrhea no fever or chills. no medication given or taken for pain control. denies vaginal discharge. no medication given or taken for pain control

## 2023-04-06 ENCOUNTER — APPOINTMENT (OUTPATIENT)
Dept: ENDOCRINOLOGY | Facility: CLINIC | Age: 43
End: 2023-04-06
Payer: COMMERCIAL

## 2023-04-06 VITALS
OXYGEN SATURATION: 98 % | BODY MASS INDEX: 28.52 KG/M2 | HEIGHT: 62 IN | DIASTOLIC BLOOD PRESSURE: 70 MMHG | HEART RATE: 53 BPM | SYSTOLIC BLOOD PRESSURE: 110 MMHG | WEIGHT: 155 LBS

## 2023-04-06 DIAGNOSIS — O99.283 ENDOCRINE, NUTRITIONAL AND METABOLIC DISEASES COMPLICATING PREGNANCY, THIRD TRIMESTER: ICD-10-CM

## 2023-04-06 DIAGNOSIS — E03.9 ENDOCRINE, NUTRITIONAL AND METABOLIC DISEASES COMPLICATING PREGNANCY, THIRD TRIMESTER: ICD-10-CM

## 2023-04-06 PROCEDURE — 99214 OFFICE O/P EST MOD 30 MIN: CPT

## 2023-04-10 ENCOUNTER — NON-APPOINTMENT (OUTPATIENT)
Age: 43
End: 2023-04-10

## 2023-09-20 ENCOUNTER — NON-APPOINTMENT (OUTPATIENT)
Age: 43
End: 2023-09-20

## 2023-10-06 ENCOUNTER — APPOINTMENT (OUTPATIENT)
Dept: ENDOCRINOLOGY | Facility: CLINIC | Age: 43
End: 2023-10-06
Payer: COMMERCIAL

## 2023-10-06 VITALS
DIASTOLIC BLOOD PRESSURE: 80 MMHG | BODY MASS INDEX: 25.21 KG/M2 | WEIGHT: 137 LBS | HEIGHT: 62 IN | SYSTOLIC BLOOD PRESSURE: 118 MMHG | HEART RATE: 80 BPM

## 2023-10-06 PROCEDURE — 99214 OFFICE O/P EST MOD 30 MIN: CPT

## 2023-10-13 ENCOUNTER — EMERGENCY (EMERGENCY)
Facility: HOSPITAL | Age: 43
LOS: 1 days | Discharge: HOME | End: 2023-10-13
Attending: EMERGENCY MEDICINE
Payer: COMMERCIAL

## 2023-10-13 VITALS
HEIGHT: 62 IN | TEMPERATURE: 98 F | DIASTOLIC BLOOD PRESSURE: 82 MMHG | WEIGHT: 134.92 LBS | HEART RATE: 86 BPM | RESPIRATION RATE: 18 BRPM | OXYGEN SATURATION: 100 % | SYSTOLIC BLOOD PRESSURE: 132 MMHG

## 2023-10-13 PROCEDURE — 99283 EMERGENCY DEPT VISIT LOW MDM: CPT

## 2023-10-13 PROCEDURE — 73130 X-RAY EXAM OF HAND: CPT

## 2023-10-13 PROCEDURE — 99284 EMERGENCY DEPT VISIT MOD MDM: CPT

## 2023-10-13 PROCEDURE — 73130 X-RAY EXAM OF HAND: CPT | Mod: 26,RT

## 2023-10-13 RX ORDER — CEPHALEXIN 500 MG
1 CAPSULE ORAL
Qty: 21 | Refills: 0
Start: 2023-10-13 | End: 2023-10-19

## 2023-10-13 RX ORDER — CEPHALEXIN 500 MG
500 CAPSULE ORAL ONCE
Refills: 0 | Status: COMPLETED | OUTPATIENT
Start: 2023-10-13 | End: 2023-10-13

## 2023-10-13 RX ORDER — ACETAMINOPHEN 500 MG
650 TABLET ORAL ONCE
Refills: 0 | Status: COMPLETED | OUTPATIENT
Start: 2023-10-13 | End: 2023-10-13

## 2023-10-13 RX ADMIN — Medication 500 MILLIGRAM(S): at 12:59

## 2023-10-13 RX ADMIN — Medication 650 MILLIGRAM(S): at 12:59

## 2023-10-13 NOTE — ED ADULT NURSE NOTE - NSFALLUNIVINTERV_ED_ALL_ED
Bed/Stretcher in lowest position, wheels locked, appropriate side rails in place/Call bell, personal items and telephone in reach/Instruct patient to call for assistance before getting out of bed/chair/stretcher/Non-slip footwear applied when patient is off stretcher/[place] to call system/Physically safe environment - no spills, clutter or unnecessary equipment/Purposeful proactive rounding/Room/bathroom lighting operational, light cord in reach

## 2023-10-13 NOTE — ED ADULT NURSE NOTE - OBJECTIVE STATEMENT
Pt received A&Ox4 in ST c/o R hand pain associated with slight swelling and redness. Pt states she noticed symptoms began yesterday. Denies any new lotions, insect bites, or trauma to hand. Denies fevers/chills. Respirations even & unlabored. NAD. Pt made aware of plan of care and verbalized understanding.

## 2023-10-13 NOTE — ED PROVIDER NOTE - CLINICAL SUMMARY MEDICAL DECISION MAKING FREE TEXT BOX
hx hypothyroid c/o 1 day of swelling, mild pain/erythema/itching to right dorsal 3rd mcp. denies trauma, unsure if insect/somethign bit it, no f/c. no hx of joint pain/issues. no other complaints. very well appearing, mild swellign to dorsal 3rd mcp and proximally, very faint erythema to area, not involving joint, no fusiform swelling, finger rom wnl, no ttp over tendon and not held in abnl flexion/extension. will get xray, treat symptoms, supportive care, possible developing early cellulitis, no sign deeper infection/joint/tensor involvement. hand f/u prn.

## 2023-10-13 NOTE — ED PROVIDER NOTE - NSFOLLOWUPINSTRUCTIONS_ED_ALL_ED_FT
Cellulitis    Cellulitis is a skin infection caused by bacteria. This condition occurs most often in the arms and lower legs but can occur anywhere over the body. Symptoms include redness, swelling, warm skin, tenderness, and chills/fever. If you were prescribed an antibiotic medicine, take it as told by your health care provider. Do not stop taking the antibiotic even if you start to feel better.    SEEK IMMEDIATE MEDICAL CARE IF YOU HAVE ANY OF THE FOLLOWING SYMPTOMS: worsening fever, red streaks coming from affected area, vomiting or diarrhea, or dizziness/lightheadedness.     Please follow up with your Primary Care Doctor in 1 - 2 days. If you cannot follow-up with your primary care doctor please return to the Emergency Department for any urgent issues.  - Seek immediate medical care for any new, worsening or concerning signs or symptoms.     - If you have difficulty following up, please call: [phone] or go to [email or web address] to obtain a [facility] doctor or specialist who takes your insurance in your area.    Continue all previously prescribed medications as directed. You can use motrin 600mg every 6-8 hours for pain or fever, and/or Tylenol 650 mg every 4 hours for pain/fever. Follow up with your primary care physician in 48-72 hours- bring copies of your results.  Return to the emergency department for chest pain, shortness of breath, dizziness, or worsening, concerning, or persistent symptoms.

## 2023-10-13 NOTE — ED PROVIDER NOTE - PROGRESS NOTE DETAILS
Butler: xray without fracture. empiric abx for possible developing cellulitis. supportive care, f/u return precautions.

## 2023-10-13 NOTE — ED PROVIDER NOTE - NS ED ROS FT
Constitutional:  See HPI  Cardiac:  No chest pain  Respiratory:  No cough or respiratory distress.   GI:  No nausea, vomiting, diarrhea or abdominal pain.  Neuro:  No headache   Skin:  See HPI     Except as documented in the HPI,  all other systems are negative

## 2023-10-13 NOTE — ED PROVIDER NOTE - PHYSICAL EXAMINATION
Gen: AAOx3, non-toxic well appearing female in no acute distress sitting comfortably in chair   Lung: CTAB, no respiratory distress, no wheezes/rhonchi/rales B/L, speaking in full sentences  CV: RRR, no murmurs, rubs or gallops  Abd: soft, NTND, no guarding, no CVA tenderness  MSK: no visible deformities  Neuro: No focal sensory or motor deficits  Skin: Warm, well perfused, no rash  Psych: normal affect.   ~Anjali Aguirre, MS4 Gen: AAOx3, non-toxic well appearing female in no acute distress sitting comfortably in chair   Lung: CTAB, no respiratory distress, no wheezes/rhonchi/rales B/L, speaking in full sentences  CV: RRR, no murmurs, rubs or gallops  Abd: soft, NTND, no guarding, no CVA tenderness  MSK: no visible deformities  Neuro: No focal sensory or motor deficits. Mild paresthesia of right hand  Skin: Warm, well perfused, Mild erythema around R dorsal aspect of 3rd MCP with associated swelling extending into 2-4th digits. Tenderness on orozco and dorsal aspects.   Psych: normal affect.   ~Anjali Aguirre, MS4

## 2023-10-13 NOTE — ED PROVIDER NOTE - PATIENT PORTAL LINK FT
You can access the FollowMyHealth Patient Portal offered by [facility] by registering at the following website: [email or web address]. By joining [facility]’s FollowMyHealth portal, you will also be able to view your health information using other applications (apps) compatible with our system.

## 2023-10-13 NOTE — ED PROVIDER NOTE - CARE PROVIDER_API CALL
Sanjuanita Rose  Orthopaedic Surgery  [address] [place] [address]  Phone: [phone]  Fax: [phone]  Follow Up Time: 4-6 Days

## 2023-10-13 NOTE — ED PROVIDER NOTE - NSDCPRINTRESULTS_ED_ALL_ED
Patient requests all Lab, Cardiology, and Radiology Results on their Discharge Instructions
with patient

## 2023-10-13 NOTE — ED PROVIDER NOTE - ATTENDING CONTRIBUTION TO CARE
Luke: I performed a face to face bedside interview with patient regarding history of present illness, review of symptoms and past medical history. I completed an independent physical exam.  I have discussed patient's plan of care with the medical student  I agree with note as stated above including HISTORY OF PRESENT ILLNESS, HIV, PAST MEDICAL/SURGICAL/FAMILY/SOCIAL HISTORY, ALLERGIES AND HOME MEDICATIONS, REVIEW OF SYSTEMS, PHYSICAL EXAM, MEDICAL DECISION MAKING and any PROGRESS NOTES during the time I functioned as the attending physician for this patient  unless otherwise noted. My assessment is as follows: hx hypothyroid c/o 1 day of swelling, mild pain/erythema/itching to right dorsal 3rd mcp. denies trauma, unsure if insect/somethign bit it, no f/c. no hx of joint pain/issues. no other complaints. very well appearing, mild swellign to dorsal 3rd mcp and proximally, very faint erythema to area, not involving joint, no fusiform swelling, finger rom wnl, no ttp over tendon and not held in abnl flexion/extension. will get xray, treat symptoms, supportive care, possible developing early cellulitis, no sign deeper infection/joint/tensor involvement. hand f/u prn.

## 2024-01-28 ENCOUNTER — NON-APPOINTMENT (OUTPATIENT)
Age: 44
End: 2024-01-28

## 2024-02-09 NOTE — ED STATDOCS - ATTENDING CONTRIBUTION TO CARE
I, Wendy Leon, performed the initial face to face bedside interview with this patient regarding history of present illness, review of symptoms and relevant past medical, social and family history.  I completed an independent physical examination.  I was the initial provider who evaluated this patient.   The history, relevant review of systems, past medical and surgical history, medical decision making, and physical examination was documented by the scribe in my presence and I attest to the accuracy of the documentation.     Follow-up on ordered tests (ie labs, radiologic studies) and re-evaluation of the patient's status has been communicated to the ACP.  Disposition of the patient will be based on test outcome and response to ED interventions.
No

## 2024-03-28 ENCOUNTER — NON-APPOINTMENT (OUTPATIENT)
Age: 44
End: 2024-03-28

## 2024-04-08 NOTE — DISCHARGE NOTE OB - INCREASED VAGINAL BLEEDING OR LARGE CLOTS (SATURATING A PAD AN HOUR)

## 2024-04-11 LAB — TSH SERPL-ACNC: 7.34

## 2024-04-12 ENCOUNTER — APPOINTMENT (OUTPATIENT)
Dept: ENDOCRINOLOGY | Facility: CLINIC | Age: 44
End: 2024-04-12
Payer: COMMERCIAL

## 2024-04-12 VITALS
OXYGEN SATURATION: 98 % | DIASTOLIC BLOOD PRESSURE: 74 MMHG | HEART RATE: 72 BPM | SYSTOLIC BLOOD PRESSURE: 104 MMHG | HEIGHT: 62 IN | WEIGHT: 145 LBS | BODY MASS INDEX: 26.68 KG/M2

## 2024-04-12 DIAGNOSIS — E03.9 HYPOTHYROIDISM, UNSPECIFIED: ICD-10-CM

## 2024-04-12 PROCEDURE — 99214 OFFICE O/P EST MOD 30 MIN: CPT

## 2024-04-12 RX ORDER — LEVOTHYROXINE SODIUM 0.14 MG/1
137 TABLET ORAL
Qty: 90 | Refills: 1 | Status: ACTIVE | COMMUNITY
Start: 2020-07-22 | End: 1900-01-01

## 2024-04-12 NOTE — REVIEW OF SYSTEMS
[Fatigue] : fatigue [Recent Weight Loss (___ Lbs)] : recent weight loss: [unfilled] lbs [Decreased Appetite] : appetite not decreased [Visual Field Defect] : no visual field defect [Dry Eyes] : no dryness [Eye Pain] : no pain [Dysphagia] : no dysphagia [Neck Pain] : no neck pain [Dysphonia] : no dysphonia [Chest Pain] : no chest pain [Palpitations] : no palpitations [Lower Ext Edema] : no lower extremity edema [Shortness Of Breath] : no shortness of breath [Cough] : no cough [Orthopnea] : no orthopnea [Nausea] : no nausea [Constipation] : no constipation [Vomiting] : no vomiting [Diarrhea] : no diarrhea [Gas/Bloating] : no gas/bloating [Polyuria] : no polyuria [Dysuria] : no dysuria [Joint Pain] : no joint pain [Muscle Weakness] : no muscle weakness [Muscle Cramps] : no muscle cramps [Acanthosis] : no acanthosis  [Acne] : no acne [Dry Skin] : no dry skin [Headaches] : no headaches [Dizziness] : no dizziness [Tremors] : no tremors [Depression] : no depression [Suicidal Ideation] : no suicidal ideation [Insomnia] : no insomnia [Anxiety] : no anxiety [Polydipsia] : no polydipsia [Cold Intolerance] : no cold intolerance [Heat Intolerance] : no heat intolerance [Easy Bleeding] : no ~M tendency for easy bleeding [Easy Bruising] : no tendency for easy bruising

## 2024-04-12 NOTE — DATA REVIEWED
[FreeTextEntry1] : Outside labs:\par  1/30/2021:\par  TSH: 0.086\par  FT4:1.25\par  T3:115\par  \par  6/5/21:\par  TSH: 0.162

## 2024-04-12 NOTE — PHYSICAL EXAM
[Alert] : alert [Well Nourished] : well nourished [No Acute Distress] : no acute distress [Normal Sclera/Conjunctiva] : normal sclera/conjunctiva [No Proptosis] : no proptosis [No Lid Lag] : no lid lag [No Neck Mass] : no neck mass was observed [Thyroid Not Enlarged] : the thyroid was not enlarged [No Thyroid Nodules] : no palpable thyroid nodules [No Respiratory Distress] : no respiratory distress [No Accessory Muscle Use] : no accessory muscle use [Clear to Auscultation] : lungs were clear to auscultation bilaterally [Normal S1, S2] : normal S1 and S2 [Normal Rate] : heart rate was normal [Regular Rhythm] : with a regular rhythm [No Tremors] : no tremors [Oriented x3] : oriented to person, place, and time [Normal Affect] : the affect was normal [Normal Insight/Judgement] : insight and judgment were intact [Normal Mood] : the mood was normal [de-identified] : Gravid

## 2024-04-12 NOTE — HISTORY OF PRESENT ILLNESS
[FreeTextEntry1] : Maribel is a 43 yr old female, who is here  for follow up of  hypothyroidism. Has 3 kids.  Per patient she was diagnosed 18+ years ago. Has been on replacement since then.  on levothyroxine 137 mcg daily, dose changed in 6/22, TSH was around 8 then. Then in 10/24 her TSH was 0.027, so we backed off to   take 1 pill of levothyroxine  for 6 days a week and take 1/2 pill for 1 day a week, she says she took it for some time, but now for last few months she has been tasking 1 pill daily.  Here for follow up. Takes it empty stomach in morning, admits to missing 1 to 2 x doses a week. Energy is  fine. No new complains. Weight fluctuates. No family h/o thyroid disorder. Denies heat or cold intolerance,has some  constipation not new , no palpitations,no skin or hair changes.

## 2024-06-13 NOTE — ED PROVIDER NOTE - BIRTH SEX
Female
Diet, DASH/TLC:   Sodium & Cholesterol Restricted  Supplement Feeding Modality:  Oral  Ensure Enlive Cans or Servings Per Day:  1       Frequency:  Three Times a day (06-10-24 @ 05:07)

## 2024-08-02 ENCOUNTER — APPOINTMENT (OUTPATIENT)
Dept: ENDOCRINOLOGY | Facility: CLINIC | Age: 44
End: 2024-08-02
Payer: COMMERCIAL

## 2024-08-02 VITALS
DIASTOLIC BLOOD PRESSURE: 62 MMHG | SYSTOLIC BLOOD PRESSURE: 116 MMHG | OXYGEN SATURATION: 98 % | WEIGHT: 152 LBS | HEIGHT: 60 IN | HEART RATE: 96 BPM | BODY MASS INDEX: 29.84 KG/M2

## 2024-08-02 DIAGNOSIS — E03.9 HYPOTHYROIDISM, UNSPECIFIED: ICD-10-CM

## 2024-08-02 PROCEDURE — 99214 OFFICE O/P EST MOD 30 MIN: CPT

## 2024-08-02 NOTE — PHYSICAL EXAM
[Alert] : alert [Well Nourished] : well nourished [No Acute Distress] : no acute distress [Normal Sclera/Conjunctiva] : normal sclera/conjunctiva [No Proptosis] : no proptosis [No Lid Lag] : no lid lag [No Neck Mass] : no neck mass was observed [Thyroid Not Enlarged] : the thyroid was not enlarged [No Thyroid Nodules] : no palpable thyroid nodules [No Respiratory Distress] : no respiratory distress [No Accessory Muscle Use] : no accessory muscle use [Clear to Auscultation] : lungs were clear to auscultation bilaterally [Normal S1, S2] : normal S1 and S2 [Normal Rate] : heart rate was normal [Regular Rhythm] : with a regular rhythm [No Tremors] : no tremors [Oriented x3] : oriented to person, place, and time [Normal Affect] : the affect was normal [Normal Insight/Judgement] : insight and judgment were intact [Normal Mood] : the mood was normal [de-identified] : Gravid

## 2024-08-02 NOTE — ASSESSMENT
[FreeTextEntry1] : Ms. JO BAEZA is here for follow up of hypothyroidism.  On levothyroxine 137 mcg daily , dose changed in 6/22, TSH was around 8 then. TSH 0.329 in 4/23.Then in 10/24 her TSH was 0.027, so we backed off to   take 1 pill of levothyroxine  for 6 days a week and take 1/2 pill for 1 day a week, she says she took it for some time, but now for last few months she has been tasking 1 pill daily. But admits to missing doses on same dose her TSH was suppressed when she took it regularly, then TSH mildly high around 7 in 4/24 but she was not taking it regularly. Now taking it regularly. TSH  normal this visit 0.515 Continue same dose  for now Discussed with patient how to take thyroid medication appropriately,empty stomach , all Multi vitamins  4 to 6 hrs away form thyroid medication, he voiced understanding. Will repeat blood work this and  next visit.   RTC in 6 months with blood work

## 2024-08-02 NOTE — HISTORY OF PRESENT ILLNESS
[FreeTextEntry1] : Maribel is a 43 yr old female, who is here  for follow up of  hypothyroidism. Has 3 kids.  Per patient she was diagnosed 18+ years ago. Has been on replacement since then.  on levothyroxine 137 mcg daily, dose changed in 6/22, TSH was around 8 then. Then in 10/24 her TSH was 0.027, so we backed off to   take 1 pill of levothyroxine  for 6 days a week and take 1/2 pill for 1 day a week, she says she took it for some time, but then she  had been tasking 1 pill daily.  then TSH mildly high around 7 in 4/24 but she was not taking it regularly. Now taking it regularly.  Here for follow up. Takes it empty stomach in morning,, denies missing doses. Energy is  fine, has good and bad days. No new complains. Weight fluctuates.gained a few pounds. No family h/o thyroid disorder. Denies heat or cold intolerance,has some  constipation not new , no palpitations,no skin or hair changes.

## 2024-08-27 ENCOUNTER — NON-APPOINTMENT (OUTPATIENT)
Age: 44
End: 2024-08-27

## 2024-09-24 ENCOUNTER — NON-APPOINTMENT (OUTPATIENT)
Age: 44
End: 2024-09-24

## 2024-11-22 ENCOUNTER — NON-APPOINTMENT (OUTPATIENT)
Age: 44
End: 2024-11-22

## 2025-01-02 ENCOUNTER — NON-APPOINTMENT (OUTPATIENT)
Age: 45
End: 2025-01-02

## 2025-01-02 ENCOUNTER — APPOINTMENT (OUTPATIENT)
Dept: ENDOCRINOLOGY | Facility: CLINIC | Age: 45
End: 2025-01-02
Payer: COMMERCIAL

## 2025-01-02 VITALS
SYSTOLIC BLOOD PRESSURE: 118 MMHG | DIASTOLIC BLOOD PRESSURE: 76 MMHG | BODY MASS INDEX: 29.45 KG/M2 | HEART RATE: 80 BPM | HEIGHT: 60 IN | WEIGHT: 150 LBS | OXYGEN SATURATION: 98 %

## 2025-01-02 DIAGNOSIS — E03.9 HYPOTHYROIDISM, UNSPECIFIED: ICD-10-CM

## 2025-01-02 PROCEDURE — 99214 OFFICE O/P EST MOD 30 MIN: CPT

## 2025-02-18 ENCOUNTER — APPOINTMENT (OUTPATIENT)
Dept: GASTROENTEROLOGY | Facility: CLINIC | Age: 45
End: 2025-02-18
Payer: COMMERCIAL

## 2025-02-18 VITALS
BODY MASS INDEX: 31.02 KG/M2 | HEART RATE: 70 BPM | RESPIRATION RATE: 16 BRPM | OXYGEN SATURATION: 98 % | DIASTOLIC BLOOD PRESSURE: 70 MMHG | SYSTOLIC BLOOD PRESSURE: 120 MMHG | HEIGHT: 60 IN | WEIGHT: 158 LBS

## 2025-02-18 DIAGNOSIS — Z83.3 FAMILY HISTORY OF DIABETES MELLITUS: ICD-10-CM

## 2025-02-18 DIAGNOSIS — K62.5 HEMORRHAGE OF ANUS AND RECTUM: ICD-10-CM

## 2025-02-18 DIAGNOSIS — K59.00 CONSTIPATION, UNSPECIFIED: ICD-10-CM

## 2025-02-18 DIAGNOSIS — Z87.42 PERSONAL HISTORY OF OTHER DISEASES OF THE FEMALE GENITAL TRACT: ICD-10-CM

## 2025-02-18 PROCEDURE — 99202 OFFICE O/P NEW SF 15 MIN: CPT

## 2025-02-18 RX ORDER — PSYLLIUM HUSK (WITH SUGAR) 3 G/7 G
43 POWDER (GRAM) ORAL DAILY
Qty: 1 | Refills: 0 | Status: ACTIVE | COMMUNITY
Start: 2025-02-18 | End: 1900-01-01

## 2025-02-18 RX ORDER — SODIUM SULFATE, MAGNESIUM SULFATE, AND POTASSIUM CHLORIDE 17.75; 2.7; 2.25 G/1; G/1; G/1
1479-225-188 TABLET ORAL
Qty: 24 | Refills: 0 | Status: ACTIVE | COMMUNITY
Start: 2025-02-18 | End: 1900-01-01

## 2025-02-18 RX ORDER — POLYETHYLENE GLYCOL 3350 17 G/17G
17 POWDER, FOR SOLUTION ORAL DAILY
Qty: 1020 | Refills: 0 | Status: ACTIVE | COMMUNITY
Start: 2025-02-18 | End: 1900-01-01

## 2025-02-18 RX ORDER — LINACLOTIDE 145 UG/1
145 CAPSULE, GELATIN COATED ORAL
Qty: 30 | Refills: 4 | Status: ACTIVE | COMMUNITY
Start: 2025-02-18 | End: 1900-01-01

## 2025-03-13 RX ORDER — POLYETHYLENE GLYCOL 3350 AND ELECTROLYTES WITH LEMON FLAVOR 236; 22.74; 6.74; 5.86; 2.97 G/4L; G/4L; G/4L; G/4L; G/4L
236 POWDER, FOR SOLUTION ORAL
Qty: 1 | Refills: 0 | Status: ACTIVE | COMMUNITY
Start: 2025-03-13 | End: 1900-01-01

## 2025-03-14 ENCOUNTER — APPOINTMENT (OUTPATIENT)
Dept: GASTROENTEROLOGY | Facility: GI CENTER | Age: 45
End: 2025-03-14
Payer: COMMERCIAL

## 2025-03-14 ENCOUNTER — OUTPATIENT (OUTPATIENT)
Dept: OUTPATIENT SERVICES | Facility: HOSPITAL | Age: 45
LOS: 1 days | End: 2025-03-14
Payer: COMMERCIAL

## 2025-03-14 ENCOUNTER — TRANSCRIPTION ENCOUNTER (OUTPATIENT)
Age: 45
End: 2025-03-14

## 2025-03-14 DIAGNOSIS — K62.5 HEMORRHAGE OF ANUS AND RECTUM: ICD-10-CM

## 2025-03-14 PROCEDURE — 45378 DIAGNOSTIC COLONOSCOPY: CPT

## 2025-03-14 PROCEDURE — 43235 EGD DIAGNOSTIC BRUSH WASH: CPT

## 2025-03-22 ENCOUNTER — NON-APPOINTMENT (OUTPATIENT)
Age: 45
End: 2025-03-22

## 2025-04-07 NOTE — ED ADULT NURSE NOTE - CAS DISCH BELONGINGS RETURNED
To ER entrance per w/c for discharge home with his wife belongings sent with patient.    Not applicable

## 2025-06-13 ENCOUNTER — APPOINTMENT (OUTPATIENT)
Dept: ENDOCRINOLOGY | Facility: CLINIC | Age: 45
End: 2025-06-13

## 2025-09-19 ENCOUNTER — APPOINTMENT (OUTPATIENT)
Dept: ENDOCRINOLOGY | Facility: CLINIC | Age: 45
End: 2025-09-19
Payer: COMMERCIAL

## 2025-09-19 VITALS
HEIGHT: 60 IN | SYSTOLIC BLOOD PRESSURE: 108 MMHG | DIASTOLIC BLOOD PRESSURE: 72 MMHG | WEIGHT: 151 LBS | OXYGEN SATURATION: 93 % | BODY MASS INDEX: 29.64 KG/M2 | HEART RATE: 81 BPM

## 2025-09-19 DIAGNOSIS — E03.9 HYPOTHYROIDISM, UNSPECIFIED: ICD-10-CM

## 2025-09-19 PROCEDURE — 99214 OFFICE O/P EST MOD 30 MIN: CPT

## (undated) DEVICE — KIT DEFENDO 4 OLY 4 PC

## (undated) DEVICE — FORCEP ENDOJAW AGTR LC W NDL 2.8MM 230CM DISP